# Patient Record
Sex: FEMALE | Race: WHITE | NOT HISPANIC OR LATINO | Employment: OTHER | URBAN - METROPOLITAN AREA
[De-identification: names, ages, dates, MRNs, and addresses within clinical notes are randomized per-mention and may not be internally consistent; named-entity substitution may affect disease eponyms.]

---

## 2017-01-02 RX ORDER — TRAZODONE HYDROCHLORIDE 100 MG/1
TABLET ORAL
Qty: 90 TABLET | Refills: 4 | Status: SHIPPED | OUTPATIENT
Start: 2017-01-02 | End: 2017-04-04 | Stop reason: SDUPTHER

## 2017-01-17 ENCOUNTER — TELEPHONE (OUTPATIENT)
Dept: FAMILY MEDICINE | Facility: CLINIC | Age: 72
End: 2017-01-17

## 2017-01-17 NOTE — TELEPHONE ENCOUNTER
----- Message from Malini aPtel sent at 1/17/2017  9:26 AM CST -----  Contact: Pt  Pt request call from nurse regarding surgery clearance with results from her last EKG that was suppose to be sent to Dr Waller but they have not received it yet and the phone number is [phone]  and ask for Vera, please contact pt at [phone]

## 2017-01-23 ENCOUNTER — TELEPHONE (OUTPATIENT)
Dept: FAMILY MEDICINE | Facility: CLINIC | Age: 72
End: 2017-01-23

## 2017-01-23 NOTE — TELEPHONE ENCOUNTER
----- Message from Tico Powelles sent at 1/23/2017  1:50 PM CST -----  Contact: Daniella-[facility] Rhkqwdjiny-[phone] Ext [phone]   Please refax pre-op clearance on patient, please refax clearance to [phone].  Pt surgery is scheduled for 01/24/16, surgery will be canceled if pre-op has not been received.  Please call back  @ [phone] Ext.[phone].  Thank-[facility]

## 2017-01-23 NOTE — TELEPHONE ENCOUNTER
----- Message from Mireya Bynum sent at 1/23/2017  2:00 PM CST -----  Contact: pt  Please call pt @ [phone], regarding clearance for surgery, pt states her surgery is tomorrow, she have called several times for this, please fax clearance to [phone] attn to Daniella.

## 2017-03-31 RX ORDER — LOSARTAN POTASSIUM AND HYDROCHLOROTHIAZIDE 12.5; 1 MG/1; MG/1
1 TABLET ORAL DAILY
Qty: 30 TABLET | Refills: 12 | Status: SHIPPED | OUTPATIENT
Start: 2017-03-31 | End: 2017-04-04 | Stop reason: SDUPTHER

## 2017-03-31 NOTE — TELEPHONE ENCOUNTER
----- Message from Charu Yuan sent at 3/31/2017 11:27 AM CDT -----  Contact: Patient   Patient does not have enough pills to last her until her mail order comes in for Rx Losartan, Please send in as an early refill to local pharmacy, Please call her at [phone].      [facility] [id number] [place] [address] [address] AT [facility] [address] [address]  Phone: [phone] Fax: [phone]    Thanks  Td

## 2017-04-04 RX ORDER — TRAZODONE HYDROCHLORIDE 100 MG/1
100 TABLET ORAL NIGHTLY
Qty: 90 TABLET | Refills: 3 | Status: SHIPPED | OUTPATIENT
Start: 2017-04-04 | End: 2018-04-19 | Stop reason: SDUPTHER

## 2017-04-04 RX ORDER — LOSARTAN POTASSIUM AND HYDROCHLOROTHIAZIDE 12.5; 1 MG/1; MG/1
1 TABLET ORAL DAILY
Qty: 90 TABLET | Refills: 3 | Status: SHIPPED | OUTPATIENT
Start: 2017-04-04 | End: 2018-04-19 | Stop reason: SDUPTHER

## 2017-04-04 RX ORDER — AMLODIPINE BESYLATE 10 MG/1
10 TABLET ORAL DAILY
Qty: 90 TABLET | Refills: 3 | Status: SHIPPED | OUTPATIENT
Start: 2017-04-04 | End: 2018-04-19 | Stop reason: SDUPTHER

## 2017-08-14 ENCOUNTER — TELEPHONE (OUTPATIENT)
Dept: FAMILY MEDICINE | Facility: CLINIC | Age: 72
End: 2017-08-14

## 2017-08-14 DIAGNOSIS — I10 ESSENTIAL HYPERTENSION: Primary | ICD-10-CM

## 2017-08-14 NOTE — TELEPHONE ENCOUNTER
----- Message from Tiffany Yuan sent at 8/14/2017 11:30 AM CDT -----  Contact: pt [phone]  Pt called requesting for mammogram orders to be sent to  [facility]   office [phone] and (FAX # [phone])    Also, Pt wanted to know if she can get orders for blood work placed at [facility]. So when she comes in to see you on 9/11, you can go over the mammogram and blood work results with her.    Pt is requesting a confirmation call so she knows when to schedule.    Pt can be reached at  [phone]    Thanks  carmen

## 2017-09-08 ENCOUNTER — LAB VISIT (OUTPATIENT)
Dept: LAB | Facility: HOSPITAL | Age: 72
End: 2017-09-08
Attending: FAMILY MEDICINE
Payer: MEDICARE

## 2017-09-08 DIAGNOSIS — I10 ESSENTIAL HYPERTENSION: ICD-10-CM

## 2017-09-08 LAB
ALBUMIN SERPL BCP-MCNC: 3.9 G/DL
ALP SERPL-CCNC: 78 U/L
ALT SERPL W/O P-5'-P-CCNC: 27 U/L
ANION GAP SERPL CALC-SCNC: 12 MMOL/L
AST SERPL-CCNC: 25 U/L
BASOPHILS # BLD AUTO: 0.03 K/UL
BASOPHILS NFR BLD: 0.6 %
BILIRUB SERPL-MCNC: 1.3 MG/DL
BUN SERPL-MCNC: 17 MG/DL
CALCIUM SERPL-MCNC: 9.7 MG/DL
CHLORIDE SERPL-SCNC: 102 MMOL/L
CHOLEST SERPL-MCNC: 195 MG/DL
CHOLEST/HDLC SERPL: 3.8 {RATIO}
CO2 SERPL-SCNC: 30 MMOL/L
CREAT SERPL-MCNC: 0.8 MG/DL
DIFFERENTIAL METHOD: ABNORMAL
EOSINOPHIL # BLD AUTO: 0.2 K/UL
EOSINOPHIL NFR BLD: 3.4 %
ERYTHROCYTE [DISTWIDTH] IN BLOOD BY AUTOMATED COUNT: 13.1 %
EST. GFR  (AFRICAN AMERICAN): >60 ML/MIN/1.73 M^2
EST. GFR  (NON AFRICAN AMERICAN): >60 ML/MIN/1.73 M^2
GLUCOSE SERPL-MCNC: 108 MG/DL
HCT VFR BLD AUTO: 41.1 %
HDLC SERPL-MCNC: 51 MG/DL
HDLC SERPL: 26.2 %
HGB BLD-MCNC: 13.9 G/DL
LDLC SERPL CALC-MCNC: 104.8 MG/DL
LYMPHOCYTES # BLD AUTO: 1.7 K/UL
LYMPHOCYTES NFR BLD: 31.5 %
MCH RBC QN AUTO: 31.3 PG
MCHC RBC AUTO-ENTMCNC: 33.8 G/DL
MCV RBC AUTO: 93 FL
MONOCYTES # BLD AUTO: 0.3 K/UL
MONOCYTES NFR BLD: 6.2 %
NEUTROPHILS # BLD AUTO: 3.1 K/UL
NEUTROPHILS NFR BLD: 58.1 %
NONHDLC SERPL-MCNC: 144 MG/DL
PLATELET # BLD AUTO: 207 K/UL
PMV BLD AUTO: 11.5 FL
POTASSIUM SERPL-SCNC: 3.6 MMOL/L
PROT SERPL-MCNC: 7.1 G/DL
RBC # BLD AUTO: 4.44 M/UL
SODIUM SERPL-SCNC: 144 MMOL/L
TRIGL SERPL-MCNC: 196 MG/DL
WBC # BLD AUTO: 5.33 K/UL

## 2017-09-08 PROCEDURE — 80053 COMPREHEN METABOLIC PANEL: CPT

## 2017-09-08 PROCEDURE — 85025 COMPLETE CBC W/AUTO DIFF WBC: CPT

## 2017-09-08 PROCEDURE — 36415 COLL VENOUS BLD VENIPUNCTURE: CPT | Mod: PO

## 2017-09-08 PROCEDURE — 80061 LIPID PANEL: CPT

## 2017-09-11 ENCOUNTER — OFFICE VISIT (OUTPATIENT)
Dept: FAMILY MEDICINE | Facility: CLINIC | Age: 72
End: 2017-09-11
Payer: MEDICARE

## 2017-09-11 ENCOUNTER — HOSPITAL ENCOUNTER (OUTPATIENT)
Dept: RADIOLOGY | Facility: HOSPITAL | Age: 72
Discharge: HOME | End: 2017-09-11
Attending: NURSE PRACTITIONER
Payer: MEDICARE

## 2017-09-11 VITALS
DIASTOLIC BLOOD PRESSURE: 59 MMHG | SYSTOLIC BLOOD PRESSURE: 113 MMHG | TEMPERATURE: 98 F | HEIGHT: 66 IN | WEIGHT: 235.56 LBS | BODY MASS INDEX: 37.86 KG/M2 | HEART RATE: 62 BPM

## 2017-09-11 VITALS — BODY MASS INDEX: 38.67 KG/M2 | HEIGHT: 66 IN | WEIGHT: 240.63 LBS

## 2017-09-11 DIAGNOSIS — D64.9 ANEMIA, UNSPECIFIED TYPE: ICD-10-CM

## 2017-09-11 DIAGNOSIS — I10 ESSENTIAL HYPERTENSION: Primary | ICD-10-CM

## 2017-09-11 DIAGNOSIS — E66.01 MORBID OBESITY, UNSPECIFIED OBESITY TYPE: ICD-10-CM

## 2017-09-11 DIAGNOSIS — Z13.820 SCREENING FOR OSTEOPOROSIS: ICD-10-CM

## 2017-09-11 DIAGNOSIS — M81.0 OSTEOPOROSIS, UNSPECIFIED OSTEOPOROSIS TYPE, UNSPECIFIED PATHOLOGICAL FRACTURE PRESENCE: ICD-10-CM

## 2017-09-11 DIAGNOSIS — Z00.00 ROUTINE CHECK-UP: Primary | ICD-10-CM

## 2017-09-11 DIAGNOSIS — E55.9 VITAMIN D DEFICIENCY: ICD-10-CM

## 2017-09-11 DIAGNOSIS — M89.9 DISORDER OF BONE: ICD-10-CM

## 2017-09-11 DIAGNOSIS — R41.3 MEMORY DEFICIT: ICD-10-CM

## 2017-09-11 DIAGNOSIS — F51.01 PRIMARY INSOMNIA: ICD-10-CM

## 2017-09-11 PROCEDURE — 99397 PER PM REEVAL EST PAT 65+ YR: CPT | Mod: S$GLB,,, | Performed by: FAMILY MEDICINE

## 2017-09-11 PROCEDURE — 99499 UNLISTED E&M SERVICE: CPT | Mod: S$GLB,,, | Performed by: NURSE PRACTITIONER

## 2017-09-11 PROCEDURE — 77080 DXA BONE DENSITY AXIAL: CPT | Mod: 26,,, | Performed by: RADIOLOGY

## 2017-09-11 PROCEDURE — 99999 PR PBB SHADOW E&M-EST. PATIENT-LVL III: CPT | Mod: PBBFAC,,, | Performed by: NURSE PRACTITIONER

## 2017-09-11 PROCEDURE — 77080 DXA BONE DENSITY AXIAL: CPT | Mod: TC,PO

## 2017-09-11 PROCEDURE — 96160 PT-FOCUSED HLTH RISK ASSMT: CPT | Mod: S$GLB,,, | Performed by: NURSE PRACTITIONER

## 2017-09-11 PROCEDURE — 99999 PR PBB SHADOW E&M-EST. PATIENT-LVL II: CPT | Mod: PBBFAC,,, | Performed by: FAMILY MEDICINE

## 2017-09-11 RX ORDER — ERGOCALCIFEROL 1.25 MG/1
50000 CAPSULE ORAL
COMMUNITY
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
## 2017-09-11 NOTE — PROGRESS NOTES
"Betzaida Lerma presented for a  Medicare AWV and comprehensive Health Risk Assessment today. The following components were reviewed and updated:    · Medical history  · Family History  · Social history  · Allergies and Current Medications  · Health Risk Assessment  · Health Maintenance  · Care Team     ** See Completed Assessments for Annual Wellness Visit within the encounter summary.**       The following assessments were completed:  · Living Situation  · CAGE  · Depression Screening  · Timed Get Up and Go  · Whisper Test  · Cognitive Function Screening  · Nutrition Screening  · ADL Screening  · PAQ Screening    Vitals:    09/11/17 1312   BP: (!) 113/59   BP Location: Left arm   Patient Position: Sitting   BP Method: Large (Automatic)   Pulse: 62   Temp: 97.5 °F (36.4 °C)   TempSrc: Oral   Weight: 106.8 kg (235 lb 9 oz)   Height: 5' 6" (1.676 m)     Body mass index is 38.02 kg/m².  Physical Exam   Constitutional: She is oriented to person, place, and time. She appears well-developed and well-nourished. No distress.   HENT:   Head: Normocephalic and atraumatic.   Eyes: EOM are normal. Pupils are equal, round, and reactive to light.   Neck: Normal range of motion. Neck supple.   Cardiovascular: Normal rate and regular rhythm.    Pulmonary/Chest: Effort normal and breath sounds normal.   Musculoskeletal: Normal range of motion.   Neurological: She is alert and oriented to person, place, and time.   Skin: Skin is warm and dry. No rash noted.   Psychiatric: She has a normal mood and affect. Judgment normal.   Nursing note and vitals reviewed.        Diagnoses and health risks identified today and associated recommendations/orders:    1. Essential hypertension  Stable and controlled on Norvasc and Losartan HCTZ  Continue medication as prescribed  Continue current treatment plan as previously prescribed with your PCP      2. Anemia, unspecified type  Stable- continue to monitor CBC    3. Primary insomnia  Stable and " controlled on Trazodone  Continue medication as prescribed  Continue current treatment plan as previously prescribed with your PCP      4. Screening for osteoporosis    - DXA Bone Density Spine And Hip; Future    5. Disorder of bone    - DXA Bone Density Spine And Hip; Future    6. Severe obesity  Encourage healthy diet with increased activity and weight loss    Pt states last colonoscopy was with Dr Munoz on the [facility], she was told to f/u in 2018. She refuses all immunizations at this time    Provided Betzaida Loyola with a 5-10 year written screening schedule and personal prevention plan. Recommendations were developed using the USPSTF age appropriate recommendations. Education, counseling, and referrals were provided as needed. After Visit Summary printed and given to patient which includes a list of additional screenings\tests needed.    Return if symptoms worsen or fail to improve, for Routine scheduled appointment.    Rosa Lezama NP

## 2017-09-22 ENCOUNTER — TELEPHONE (OUTPATIENT)
Dept: FAMILY MEDICINE | Facility: CLINIC | Age: 72
End: 2017-09-22

## 2017-09-22 DIAGNOSIS — E55.9 VITAMIN D DEFICIENCY: ICD-10-CM

## 2017-09-22 DIAGNOSIS — M85.80 OSTEOPENIA, UNSPECIFIED LOCATION: Primary | ICD-10-CM

## 2017-09-28 PROBLEM — E66.01 SEVERE OBESITY: Status: ACTIVE | Noted: 2017-09-28

## 2017-09-28 NOTE — PROGRESS NOTES
Patient, Betzaida Lerma (MRN #[id number]), presented with a recorded BMI of 38.02 kg/m^2 and a documented comorbidity(s):  - Hypertension  to which the severe obesity is a contributing factor. This is consistent with the definition of severe obesity (BMI 35.0-35.9) with comorbidity (ICD-10 E66.01, Z68.35). The patient's severe obesity was monitored, evaluated, addressed and/or treated. This addendum to the medical record is made on 09/28/2017.

## 2017-12-12 ENCOUNTER — OFFICE VISIT (OUTPATIENT)
Dept: FAMILY MEDICINE | Facility: CLINIC | Age: 72
End: 2017-12-12
Payer: MEDICARE

## 2017-12-12 VITALS
HEIGHT: 66 IN | BODY MASS INDEX: 36.74 KG/M2 | TEMPERATURE: 98 F | WEIGHT: 228.63 LBS | SYSTOLIC BLOOD PRESSURE: 134 MMHG | DIASTOLIC BLOOD PRESSURE: 68 MMHG | HEART RATE: 62 BPM

## 2017-12-12 DIAGNOSIS — N39.3 STRESS INCONTINENCE OF URINE: ICD-10-CM

## 2017-12-12 DIAGNOSIS — S29.012A STRAIN OF LATISSIMUS DORSI MUSCLE, INITIAL ENCOUNTER: ICD-10-CM

## 2017-12-12 DIAGNOSIS — M25.552 ARTHRALGIA OF LEFT HIP: Primary | ICD-10-CM

## 2017-12-12 PROCEDURE — 99214 OFFICE O/P EST MOD 30 MIN: CPT | Mod: S$GLB,,, | Performed by: FAMILY MEDICINE

## 2017-12-12 PROCEDURE — 99999 PR PBB SHADOW E&M-EST. PATIENT-LVL III: CPT | Mod: PBBFAC,,, | Performed by: FAMILY MEDICINE

## 2017-12-12 RX ORDER — MELOXICAM 15 MG/1
15 TABLET ORAL DAILY
Qty: 30 TABLET | Refills: 6 | Status: SHIPPED | OUTPATIENT
Start: 2017-12-12 | End: 2018-06-27

## 2017-12-12 RX ORDER — OXYBUTYNIN CHLORIDE 5 MG/1
5 TABLET, EXTENDED RELEASE ORAL DAILY
Qty: 30 TABLET | Refills: 11 | Status: SHIPPED | OUTPATIENT
Start: 2017-12-12 | End: 2018-10-17
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
## 2018-02-26 ENCOUNTER — TELEPHONE (OUTPATIENT)
Dept: FAMILY MEDICINE | Facility: CLINIC | Age: 73
End: 2018-02-26
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
## 2018-02-26 NOTE — TELEPHONE ENCOUNTER
----- Message from Yandy Santana sent at 2/26/2018  8:38 AM CST -----  Pt at [phone]//states she takes 2 blood pressure meds per day//she is now taking them in the morning instead of at night//since she changed the time of day she takes them she wakes up in the morning with lightheadedness/dizzy//would like to discuss with your office//please call//thanks/safia

## 2018-03-26 ENCOUNTER — LAB VISIT (OUTPATIENT)
Dept: LAB | Facility: HOSPITAL | Age: 73
End: 2018-03-26
Attending: FAMILY MEDICINE
Payer: MEDICARE

## 2018-03-26 DIAGNOSIS — E55.9 VITAMIN D DEFICIENCY: ICD-10-CM

## 2018-03-26 LAB — 25(OH)D3+25(OH)D2 SERPL-MCNC: 25 NG/ML

## 2018-03-26 PROCEDURE — 82306 VITAMIN D 25 HYDROXY: CPT

## 2018-03-26 PROCEDURE — 36415 COLL VENOUS BLD VENIPUNCTURE: CPT | Mod: PO

## 2018-03-27 ENCOUNTER — TELEPHONE (OUTPATIENT)
Dept: FAMILY MEDICINE | Facility: CLINIC | Age: 73
End: 2018-03-27

## 2018-03-27 DIAGNOSIS — E55.9 VITAMIN D DEFICIENCY: Primary | ICD-10-CM

## 2018-04-19 RX ORDER — LOSARTAN POTASSIUM AND HYDROCHLOROTHIAZIDE 12.5; 1 MG/1; MG/1
1 TABLET ORAL DAILY
Qty: 90 TABLET | Refills: 3 | Status: CANCELLED | OUTPATIENT
Start: 2018-04-19

## 2018-04-19 RX ORDER — LOSARTAN POTASSIUM AND HYDROCHLOROTHIAZIDE 12.5; 1 MG/1; MG/1
1 TABLET ORAL DAILY
Qty: 90 TABLET | Refills: 3 | Status: SHIPPED | OUTPATIENT
Start: 2018-04-19 | End: 2018-04-19 | Stop reason: SDUPTHER

## 2018-04-19 RX ORDER — AMLODIPINE BESYLATE 10 MG/1
10 TABLET ORAL DAILY
Qty: 90 TABLET | Refills: 3 | Status: CANCELLED | OUTPATIENT
Start: 2018-04-19

## 2018-04-19 RX ORDER — TRAZODONE HYDROCHLORIDE 100 MG/1
100 TABLET ORAL NIGHTLY
Qty: 90 TABLET | Refills: 3 | Status: SHIPPED | OUTPATIENT
Start: 2018-04-19 | End: 2018-04-19 | Stop reason: SDUPTHER

## 2018-04-19 RX ORDER — TRAZODONE HYDROCHLORIDE 100 MG/1
100 TABLET ORAL NIGHTLY
Qty: 90 TABLET | Refills: 3 | Status: CANCELLED | OUTPATIENT
Start: 2018-04-19

## 2018-04-19 RX ORDER — AMLODIPINE BESYLATE 10 MG/1
10 TABLET ORAL DAILY
Qty: 90 TABLET | Refills: 3 | Status: SHIPPED | OUTPATIENT
Start: 2018-04-19 | End: 2018-04-19 | Stop reason: SDUPTHER

## 2018-04-19 NOTE — TELEPHONE ENCOUNTER
----- Message from Eneida Cadena sent at 4/19/2018 10:15 AM CDT -----  Contact: pt  Pt stated she calling for a refill on her medication, she can be reached at [phone] Thanks    1. What is the name of the medication you are requesting? Losartan hctz   2. What is the dose? 112.5 mg tab  3. How do you take the medication? Orally, topically, etc? orally  4. How often do you take this medication? 1 a day   5. Do you need a 30 day or 90 day supply? 90  6. How many refills are you requesting?   7. What is your preferred pharmacy and location of the pharmacy?   8. Who can we contact with further questions?     1. What is the name of the medication you are requesting? amlogipine  2. What is the dose? 10 mg   3. How do you take the medication? Orally, topically, etc? orally  4. How often do you take this medication? 1 a day  5. Do you need a 30 day or 90 day supply? 90  6. How many refills are you requesting?   7. What is your preferred pharmacy and location of the pharmacy?  8. Who can we contact with further questions?     1. What is the name of the medication you are requesting? Trazodone   2. What is the dose? 100 mg  3. How do you take the medication? Orally, topically, etc? orally  4. How often do you take this medication? 1 a day   5. Do you need a 30 day or 90 day supply? 90  6. How many refills are you requesting?   7. What is your preferred pharmacy and location of the pharmacy?   8. Who can we contact with further questions?         [facility] - [place], [place] - [address]  [address] [facility] [address] [facility] [address]  Phone: [phone] Fax: [phone]

## 2018-04-19 NOTE — TELEPHONE ENCOUNTER
----- Message from Mireya Bynum sent at 4/19/2018  2:00 PM CDT -----  Contact: pt  1. What is the name of the medication you are requesting? amlodipine, Trazadone, losartan  2. What is the dose? na  3. How do you take the medication? Orally, topically, etc? orally  4. How often do you take this medication? 1daily  5. Do you need a 30 day or 90 day supply? 90  6. How many refills are you requesting? 1  7. What is your preferred pharmacy and location of the pharmacy? [facility]  8. Who can we contact with further questions? [phone]

## 2018-04-20 RX ORDER — TRAZODONE HYDROCHLORIDE 100 MG/1
100 TABLET ORAL NIGHTLY
Qty: 90 TABLET | Refills: 3 | Status: SHIPPED | OUTPATIENT
Start: 2018-04-20

## 2018-04-20 RX ORDER — AMLODIPINE BESYLATE 10 MG/1
10 TABLET ORAL DAILY
Qty: 90 TABLET | Refills: 3 | Status: SHIPPED | OUTPATIENT
Start: 2018-04-20

## 2018-04-20 RX ORDER — LOSARTAN POTASSIUM AND HYDROCHLOROTHIAZIDE 12.5; 1 MG/1; MG/1
1 TABLET ORAL DAILY
Qty: 90 TABLET | Refills: 3 | Status: SHIPPED | OUTPATIENT
Start: 2018-04-20

## 2018-04-24 ENCOUNTER — TELEPHONE (OUTPATIENT)
Dept: FAMILY MEDICINE | Facility: CLINIC | Age: 73
End: 2018-04-24

## 2018-04-24 NOTE — TELEPHONE ENCOUNTER
----- Message from Clara Yuan sent at 4/24/2018  2:09 PM CDT -----  Contact: pt   Call pt regarding questions about her med.    [phone]

## 2018-04-30 ENCOUNTER — PES CALL (OUTPATIENT)
Dept: ADMINISTRATIVE | Facility: CLINIC | Age: 73
End: 2018-04-30

## 2018-06-01 ENCOUNTER — PES CALL (OUTPATIENT)
Dept: ADMINISTRATIVE | Facility: CLINIC | Age: 73
End: 2018-06-01

## 2018-06-27 ENCOUNTER — OFFICE VISIT (OUTPATIENT)
Dept: FAMILY MEDICINE | Facility: CLINIC | Age: 73
End: 2018-06-27
Payer: MEDICARE

## 2018-06-27 ENCOUNTER — HOSPITAL ENCOUNTER (OUTPATIENT)
Dept: RADIOLOGY | Facility: HOSPITAL | Age: 73
Discharge: HOME | End: 2018-06-27
Attending: NURSE PRACTITIONER
Payer: MEDICARE

## 2018-06-27 VITALS
TEMPERATURE: 98 F | HEART RATE: 66 BPM | SYSTOLIC BLOOD PRESSURE: 140 MMHG | HEIGHT: 65 IN | WEIGHT: 217 LBS | DIASTOLIC BLOOD PRESSURE: 79 MMHG | BODY MASS INDEX: 36.15 KG/M2
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
## 2018-06-27 DIAGNOSIS — M53.3 SACRAL BACK PAIN: ICD-10-CM
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
## 2018-06-27 PROCEDURE — 99213 OFFICE O/P EST LOW 20 MIN: CPT | Mod: S$GLB,,, | Performed by: NURSE PRACTITIONER

## 2018-06-27 PROCEDURE — 72220 X-RAY EXAM SACRUM TAILBONE: CPT | Mod: TC,PO

## 2018-06-27 PROCEDURE — 3078F DIAST BP <80 MM HG: CPT | Mod: CPTII,S$GLB,, | Performed by: NURSE PRACTITIONER

## 2018-06-27 PROCEDURE — 3077F SYST BP >= 140 MM HG: CPT | Mod: CPTII,S$GLB,, | Performed by: NURSE PRACTITIONER

## 2018-06-27 PROCEDURE — 99999 PR PBB SHADOW E&M-EST. PATIENT-LVL IV: CPT | Mod: PBBFAC,,, | Performed by: NURSE PRACTITIONER

## 2018-06-27 PROCEDURE — 72220 X-RAY EXAM SACRUM TAILBONE: CPT | Mod: 26,,, | Performed by: RADIOLOGY
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
## 2018-06-28 ENCOUNTER — TELEPHONE (OUTPATIENT)
Dept: FAMILY MEDICINE | Facility: CLINIC | Age: 73
End: 2018-06-28

## 2018-06-28 NOTE — TELEPHONE ENCOUNTER
----- Message from Yandy Santana sent at 6/28/2018 12:12 PM CDT -----  Pt at [phone]//states she has an x-ray done on her lower back yesterday//is calling for the results//please call//thanks/[facility]

## 2018-07-06 RX ORDER — IBUPROFEN 800 MG/1
TABLET ORAL
Qty: 270 TABLET | Refills: 3 | Status: ON HOLD | OUTPATIENT
Start: 2018-07-06 | End: 2018-10-31 | Stop reason: HOSPADM

## 2018-07-06 NOTE — TELEPHONE ENCOUNTER
----- Message from Mireya Bynum sent at 7/6/2018 12:12 PM CDT -----  Contact: pt  1. What is the name of the medication you are requesting? Ibuprofen   2. What is the dose? 800mg  3. How do you take the medication? Orally, topically, etc? orally  4. How often do you take this medication? 1xday  5. Do you need a 30 day or 90 day supply? 90  6. How many refills are you requesting? 1  7. What is your preferred pharmacy and location of the pharmacy? [facility]  8. Who can we contact with further questions? Pt @ [phone]

## 2018-07-08 RX ORDER — MELOXICAM 15 MG/1
TABLET ORAL
Qty: 30 TABLET | Refills: 12 | Status: SHIPPED | OUTPATIENT
Start: 2018-07-08 | End: 2018-10-17 | Stop reason: CLARIF

## 2018-07-09 ENCOUNTER — TELEPHONE (OUTPATIENT)
Dept: FAMILY MEDICINE | Facility: CLINIC | Age: 73
End: 2018-07-09

## 2018-07-09 NOTE — TELEPHONE ENCOUNTER
----- Message from Rani Chapman sent at 7/9/2018  9:46 AM CDT -----   Patient would like a refill for iv profen. Please contact

## 2018-07-27 ENCOUNTER — LAB VISIT (OUTPATIENT)
Dept: LAB | Facility: HOSPITAL | Age: 73
End: 2018-07-27
Attending: FAMILY MEDICINE
Payer: MEDICARE

## 2018-07-27 DIAGNOSIS — E55.9 VITAMIN D DEFICIENCY: ICD-10-CM

## 2018-07-27 LAB — 25(OH)D3+25(OH)D2 SERPL-MCNC: 30 NG/ML

## 2018-07-27 PROCEDURE — 82306 VITAMIN D 25 HYDROXY: CPT

## 2018-07-27 PROCEDURE — 36415 COLL VENOUS BLD VENIPUNCTURE: CPT | Mod: PO

## 2018-10-03 ENCOUNTER — LAB VISIT (OUTPATIENT)
Dept: LAB | Facility: HOSPITAL | Age: 73
End: 2018-10-03
Payer: MEDICARE

## 2018-10-03 DIAGNOSIS — Z01.818 PREOP EXAMINATION: Primary | ICD-10-CM

## 2018-10-03 LAB
ANION GAP SERPL CALC-SCNC: 10 MMOL/L
BACTERIA #/AREA URNS HPF: ABNORMAL /HPF
BASOPHILS # BLD AUTO: 0.03 K/UL
BASOPHILS NFR BLD: 0.5 %
BILIRUB UR QL STRIP: NEGATIVE
BUN SERPL-MCNC: 15 MG/DL
CALCIUM SERPL-MCNC: 9.9 MG/DL
CHLORIDE SERPL-SCNC: 102 MMOL/L
CLARITY UR: CLEAR
CO2 SERPL-SCNC: 30 MMOL/L
COLOR UR: YELLOW
CREAT SERPL-MCNC: 0.8 MG/DL
DIFFERENTIAL METHOD: ABNORMAL
EOSINOPHIL # BLD AUTO: 0.1 K/UL
EOSINOPHIL NFR BLD: 1.7 %
ERYTHROCYTE [DISTWIDTH] IN BLOOD BY AUTOMATED COUNT: 13.1 %
EST. GFR  (AFRICAN AMERICAN): >60 ML/MIN/1.73 M^2
EST. GFR  (NON AFRICAN AMERICAN): >60 ML/MIN/1.73 M^2
GLUCOSE SERPL-MCNC: 119 MG/DL
GLUCOSE UR QL STRIP: NEGATIVE
HCT VFR BLD AUTO: 42.6 %
HGB BLD-MCNC: 13.7 G/DL
HGB UR QL STRIP: NEGATIVE
HYALINE CASTS #/AREA URNS LPF: 0 /LPF
IMM GRANULOCYTES # BLD AUTO: 0.02 K/UL
IMM GRANULOCYTES NFR BLD AUTO: 0.3 %
KETONES UR QL STRIP: NEGATIVE
LEUKOCYTE ESTERASE UR QL STRIP: NEGATIVE
LYMPHOCYTES # BLD AUTO: 1.4 K/UL
LYMPHOCYTES NFR BLD: 21.5 %
MCH RBC QN AUTO: 31.4 PG
MCHC RBC AUTO-ENTMCNC: 32.2 G/DL
MCV RBC AUTO: 98 FL
MICROSCOPIC COMMENT: ABNORMAL
MONOCYTES # BLD AUTO: 0.6 K/UL
MONOCYTES NFR BLD: 8.7 %
NEUTROPHILS # BLD AUTO: 4.3 K/UL
NEUTROPHILS NFR BLD: 67.3 %
NITRITE UR QL STRIP: NEGATIVE
NRBC BLD-RTO: 0 /100 WBC
PH UR STRIP: 6 [PH] (ref 5–8)
PLATELET # BLD AUTO: 191 K/UL
PMV BLD AUTO: 11.7 FL
POTASSIUM SERPL-SCNC: 3.3 MMOL/L
PROT UR QL STRIP: ABNORMAL
RBC # BLD AUTO: 4.36 M/UL
RBC #/AREA URNS HPF: 1 /HPF (ref 0–4)
SODIUM SERPL-SCNC: 142 MMOL/L
SP GR UR STRIP: 1.03 (ref 1–1.03)
SQUAMOUS #/AREA URNS HPF: 20 /HPF
URN SPEC COLLECT METH UR: ABNORMAL
WBC # BLD AUTO: 6.43 K/UL
WBC #/AREA URNS HPF: 2 /HPF (ref 0–5)

## 2018-10-03 PROCEDURE — 85025 COMPLETE CBC W/AUTO DIFF WBC: CPT

## 2018-10-03 PROCEDURE — 81000 URINALYSIS NONAUTO W/SCOPE: CPT | Mod: PO

## 2018-10-03 PROCEDURE — 36415 COLL VENOUS BLD VENIPUNCTURE: CPT | Mod: PO

## 2018-10-03 PROCEDURE — 80048 BASIC METABOLIC PNL TOTAL CA: CPT

## 2018-10-17 ENCOUNTER — ANESTHESIA EVENT (OUTPATIENT)
Dept: SURGERY | Facility: HOSPITAL | Age: 73
DRG: 470 | End: 2018-10-17
Payer: MEDICARE

## 2018-10-17 ENCOUNTER — HOSPITAL ENCOUNTER (OUTPATIENT)
Dept: PREADMISSION TESTING | Facility: HOSPITAL | Age: 73
Discharge: HOME | End: 2018-10-17
Payer: MEDICARE

## 2018-10-17 ENCOUNTER — OFFICE VISIT (OUTPATIENT)
Dept: FAMILY MEDICINE | Facility: HOSPITAL | Age: 73
End: 2018-10-17
Attending: FAMILY MEDICINE
Payer: MEDICARE

## 2018-10-17 VITALS
OXYGEN SATURATION: 99 % | WEIGHT: 217 LBS | HEIGHT: 66 IN | HEART RATE: 60 BPM | SYSTOLIC BLOOD PRESSURE: 156 MMHG | BODY MASS INDEX: 34.87 KG/M2 | RESPIRATION RATE: 18 BRPM | DIASTOLIC BLOOD PRESSURE: 78 MMHG

## 2018-10-17 VITALS
BODY MASS INDEX: 34.9 KG/M2 | SYSTOLIC BLOOD PRESSURE: 123 MMHG | HEART RATE: 62 BPM | HEIGHT: 66 IN | DIASTOLIC BLOOD PRESSURE: 68 MMHG | WEIGHT: 217.13 LBS

## 2018-10-17 DIAGNOSIS — Z01.818 PREOP TESTING: Primary | ICD-10-CM

## 2018-10-17 DIAGNOSIS — I10 ESSENTIAL HYPERTENSION: ICD-10-CM

## 2018-10-17 DIAGNOSIS — E66.01 SEVERE OBESITY: ICD-10-CM

## 2018-10-17 DIAGNOSIS — F51.01 PRIMARY INSOMNIA: ICD-10-CM

## 2018-10-17 PROCEDURE — 99214 OFFICE O/P EST MOD 30 MIN: CPT | Performed by: STUDENT IN AN ORGANIZED HEALTH CARE EDUCATION/TRAINING PROGRAM

## 2018-10-17 RX ORDER — POTASSIUM CHLORIDE 1500 MG/1
TABLET, EXTENDED RELEASE ORAL
Refills: 0 | COMMUNITY
Start: 2018-10-11

## 2018-10-17 RX ORDER — SODIUM CHLORIDE, SODIUM LACTATE, POTASSIUM CHLORIDE, CALCIUM CHLORIDE 600; 310; 30; 20 MG/100ML; MG/100ML; MG/100ML; MG/100ML
INJECTION, SOLUTION INTRAVENOUS CONTINUOUS
Status: CANCELLED | OUTPATIENT
Start: 2018-10-17

## 2018-10-17 RX ORDER — LIDOCAINE HYDROCHLORIDE 10 MG/ML
1 INJECTION, SOLUTION EPIDURAL; INFILTRATION; INTRACAUDAL; PERINEURAL ONCE
Status: CANCELLED | OUTPATIENT
Start: 2018-10-17 | End: 2018-10-17

## 2018-10-17 RX ORDER — METHOCARBAMOL 500 MG/1
TABLET, FILM COATED ORAL
Refills: 1 | COMMUNITY
Start: 2018-09-21

## 2018-10-17 RX ORDER — CEFAZOLIN SODIUM 2 G/50ML
2 SOLUTION INTRAVENOUS
Status: CANCELLED | OUTPATIENT
Start: 2018-10-30
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
## 2018-10-17 NOTE — DISCHARGE INSTRUCTIONS
Your surgery is scheduled for 10/30/18.    Please report to Outpatient Surgery Intake Office on the 2nd FLOOR at 5:30 a.m.          INSTRUCTIONS IMPORTANT!!!  ¨ Do not eat or drink after 12 midnight-including water. OK to brush teeth, no   gum, candy or mints!    ¨ Take only these medicines with a small swallow of water-morning of surgery: amlodipine and losartan      ____  Do not wear makeup, including mascara.  ____  No powder, lotions or creams to surgical area.  ____  Please remove all jewelry, including piercings and leave at home.  ____  No money or valuables needed. Please leave at home.  ____  Please bring any documents given by your doctor.  ____  If going home the same day, arrange for a ride home. You will not be able to             drive if Anesthesia was used.  ____  Wear loose fitting clothing. Allow for dressings, bandages.  ____  Stop Aspirin, Ibuprofen, Motrin and Aleve at least 3-5 days before surgery, unless otherwise instructed by your doctor, or the nurse.   You MAY use Tylenol/acetaminophen until day of surgery.  ____  Wash the surgical area with Hibiclens the night before surgery, and again the             morning of surgery.  Be sure to rinse hibiclens off completely (if instructed by   nurse).  ____  If you take diabetic medication, do not take am of surgery unless instructed by Doctor.  ____  Call MD for temperature above 101 degrees or any other signs of infection such as Urinary (bladder) infection, Upper respiratory infection, skin boils, etc.  ____ Stop taking any Fish Oil supplement or any Vitamins that contain Vitamin E at least 5 days prior to surgery.  ____ Do Not wear your contact lenses the day of your procedure.  You may wear your glasses.      ____Do not shave for 3 days prior to surgery.  ____ Practice Good hand washing before, during, and after procedure.      I have read or had read and explained to me, and understand the above information.  Additional comments or  instructions:  For additional questions call [phone]      Pre-Op Bathing Instructions    Before surgery, you can play an important role in your own health.    Because skin is not sterile, we need to be sure that your skin is as free of germs as possible. By following the instructions below, you can reduce the number of germs on your skin before surgery.    IMPORTANT: You will need to shower with a special soap called Hibiclens*, available at any pharmacy.  If you are allergic to Chlorhexidine (the antiseptic in Hibiclens), use an antibacterial soap such as Dial Soap for your preoperative shower.  You will shower with Hibiclens both the night before your surgery and the morning of your surgery.  Do not use Hibiclens on the head, face or genitals to avoid injury to those areas.    STEP #1: THE NIGHT BEFORE YOUR SURGERY     1. Do not shave the area of your body where your surgery will be performed.  2. Shower and wash your hair and body as usual with your normal soap and shampoo.  3. Rinse your hair and body thoroughly after you shower to remove all soap residue.  4. With your hand, apply one packet of Hibiclens soap to the surgical site.   5. Wash the site gently for five (5) minutes. Do not scrub your skin too hard.   6. Do not wash with your regular soap after Hibiclens is used.  7. Rinse your body thoroughly.  8. Pat yourself dry with a clean, soft towel.  9. Do not use lotion, cream, or powder.  10. Wear clean clothes.    STEP #2: THE MORNING OF YOUR SURGERY     1. Repeat Step #1.    * Not to be used by people allergic to Chlorhexidine.      Total Knee Replacement  During total knee replacement surgery, your damaged knee joint is replaced with an artificial joint, called a prosthesis. This surgery almost always reduces joint pain and improves your quality of life.     The parts of the prosthesis are secured to the bones of the knee. Together they form the new joint.   Before your surgery  You will most likely  arrive at the hospital on the morning of the surgery. Be sure to follow all of your doctors instructions on preparing for surgery:  · Follow any directions you are given for taking medicines or for not eating or drinking before surgery.  · At the hospital, your temperature, pulse, breathing, and blood pressure will be checked.  · An IV (intravenous) line will be started to provide fluids and medicines needed during surgery.  The surgical procedure  When the surgical team is ready, youll be taken to the operating room. There youll be given anesthesia to help you sleep through surgery, or to make you numb from the waist down. Then an incision is made on the front or side of your knee. Any damaged bone is cleaned away, and the new joint components are put into place. The incision is closed with surgical staples or stitches.  After your surgery  After surgery, youll be sent to the recovery room. When you are fully awake, youll be moved to your room. The nurses will give you medicines to ease your pain. You may have a catheter (small tube) in your bladder. A continuous passive motion machine may be used on your knee to keep it from getting stiff. A sequential compression machine may be used to prevent blood clots by gently squeezing then releasing your leg. You may be given medicine to prevent blood clots. Soon, healthcare providers will help you get up and moving.  When to call your doctor  Once at home, call your doctor if you have any of the symptoms below:  · An increase in knee pain  · Pain or swelling in the calf or leg  · Unusual redness, heat, or drainage at the incision site  · Fever of 100.4°F (38°C) or higher  · Shaking chills  · Trouble breathing or chest pain (call 911)   Date Last Reviewed: 9/20/2015  © 6783-9788 [employer]. [address] [place] [address], [place] [address]. All rights reserved. This information is not intended as a substitute for professional medical care. Always follow your  healthcare professional's instructions.
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
## 2018-10-17 NOTE — PRE-PROCEDURE INSTRUCTIONS
Srinath - [phone] -     Allergies, medical, surgical, family and psychosocial histories reviewed with patient. Periop plan of care reviewed. Preop instructions given, including medications to take and to hold. Hibiclens soap and instructions on use given. Time allotted for questions to be addressed.  Patient verbalized understanding.

## 2018-10-17 NOTE — PRE-PROCEDURE INSTRUCTIONS
Spoke with Dr. Colvin's MA to have all paperwork and clearance faxed to [phone].  Will fax asap

## 2018-10-18 NOTE — PROGRESS NOTES
I assume primary medical responsibility for this patient, I have seen the patient, reviewed the case history, findings, diagnosis and treatment plan with the resident and agree that the care is reasonable and necessary.  Racheal Ariza  10/18/2018

## 2018-10-24 ENCOUNTER — PES CALL (OUTPATIENT)
Dept: ADMINISTRATIVE | Facility: CLINIC | Age: 73
End: 2018-10-24

## 2018-10-24 NOTE — PLAN OF CARE
Spoke to Vilma in Dr. Rider office ([phone]) for most recent EKG report and note of optimization. She will send message to MA for follow up.

## 2018-10-25 ENCOUNTER — LAB VISIT (OUTPATIENT)
Dept: LAB | Facility: HOSPITAL | Age: 73
End: 2018-10-25
Attending: STUDENT IN AN ORGANIZED HEALTH CARE EDUCATION/TRAINING PROGRAM
Payer: MEDICARE

## 2018-10-25 DIAGNOSIS — Z01.818 PREOP TESTING: ICD-10-CM

## 2018-10-25 LAB
ANION GAP SERPL CALC-SCNC: 9 MMOL/L
BUN SERPL-MCNC: 19 MG/DL
CALCIUM SERPL-MCNC: 9.5 MG/DL
CHLORIDE SERPL-SCNC: 105 MMOL/L
CO2 SERPL-SCNC: 28 MMOL/L
CREAT SERPL-MCNC: 0.8 MG/DL
EST. GFR  (AFRICAN AMERICAN): >60 ML/MIN/1.73 M^2
EST. GFR  (NON AFRICAN AMERICAN): >60 ML/MIN/1.73 M^2
GLUCOSE SERPL-MCNC: 111 MG/DL
POTASSIUM SERPL-SCNC: 4.3 MMOL/L
SODIUM SERPL-SCNC: 142 MMOL/L

## 2018-10-25 PROCEDURE — 80048 BASIC METABOLIC PNL TOTAL CA: CPT

## 2018-10-25 PROCEDURE — 36415 COLL VENOUS BLD VENIPUNCTURE: CPT | Mod: PO

## 2018-10-29 ENCOUNTER — TELEPHONE (OUTPATIENT)
Dept: FAMILY MEDICINE | Facility: HOSPITAL | Age: 73
End: 2018-10-29

## 2018-10-29 NOTE — TELEPHONE ENCOUNTER
I called . Betzaida Lerma and left her a voicemail instructing her to return my call. Her Potassium is within the appropriate range to proceed with operative intervention for the total knee arthroplasty. Encouraged her to call the clinic for any additional questions/concerns that she may have.

## 2018-10-30 ENCOUNTER — ANESTHESIA (OUTPATIENT)
Dept: SURGERY | Facility: HOSPITAL | Age: 73
DRG: 470 | End: 2018-10-30
Payer: MEDICARE

## 2018-10-30 ENCOUNTER — HOSPITAL ENCOUNTER (INPATIENT)
Facility: HOSPITAL | Age: 73
LOS: 2 days | Discharge: HOME HEALTH | DRG: 470 | End: 2018-11-02
Payer: MEDICARE

## 2018-10-30 DIAGNOSIS — M17.12 LEFT KNEE DJD: ICD-10-CM

## 2018-10-30 DIAGNOSIS — Z96.651 STATUS POST TOTAL RIGHT KNEE REPLACEMENT: Primary | ICD-10-CM

## 2018-10-30 DIAGNOSIS — M17.31 POST-TRAUMATIC OSTEOARTHRITIS OF RIGHT KNEE: ICD-10-CM

## 2018-10-30 DIAGNOSIS — R44.3 HALLUCINATIONS: ICD-10-CM

## 2018-10-30 DIAGNOSIS — Z01.818 PREOP TESTING: ICD-10-CM

## 2018-10-30 PROBLEM — T84.84XA PAINFUL ORTHOPAEDIC HARDWARE: Status: ACTIVE | Noted: 2018-10-30

## 2018-10-30 PROBLEM — M17.11 RIGHT KNEE DJD: Status: ACTIVE | Noted: 2018-10-30

## 2018-10-30 PROCEDURE — 63600175 PHARM REV CODE 636 W HCPCS

## 2018-10-30 PROCEDURE — 71000039 HC RECOVERY, EACH ADD'L HOUR

## 2018-10-30 PROCEDURE — 0QPG04Z REMOVAL OF INTERNAL FIXATION DEVICE FROM RIGHT TIBIA, OPEN APPROACH: ICD-10-PCS

## 2018-10-30 PROCEDURE — C1776 JOINT DEVICE (IMPLANTABLE): HCPCS

## 2018-10-30 PROCEDURE — 64448 NJX AA&/STRD FEM NRV NFS IMG: CPT | Performed by: ANESTHESIOLOGY

## 2018-10-30 PROCEDURE — 71000033 HC RECOVERY, INTIAL HOUR

## 2018-10-30 PROCEDURE — 63600175 PHARM REV CODE 636 W HCPCS: Performed by: ANESTHESIOLOGY

## 2018-10-30 PROCEDURE — 37000008 HC ANESTHESIA 1ST 15 MINUTES

## 2018-10-30 PROCEDURE — G8978 MOBILITY CURRENT STATUS: HCPCS | Mod: CK

## 2018-10-30 PROCEDURE — 25000003 PHARM REV CODE 250

## 2018-10-30 PROCEDURE — 76942 ECHO GUIDE FOR BIOPSY: CPT | Performed by: ANESTHESIOLOGY

## 2018-10-30 PROCEDURE — 88300 SURGICAL PATH GROSS: CPT | Performed by: PATHOLOGY

## 2018-10-30 PROCEDURE — 11000001 HC ACUTE MED/SURG PRIVATE ROOM

## 2018-10-30 PROCEDURE — 97165 OT EVAL LOW COMPLEX 30 MIN: CPT

## 2018-10-30 PROCEDURE — 36000711

## 2018-10-30 PROCEDURE — 36000710

## 2018-10-30 PROCEDURE — 3E0T3BZ INTRODUCTION OF ANESTHETIC AGENT INTO PERIPHERAL NERVES AND PLEXI, PERCUTANEOUS APPROACH: ICD-10-PCS | Performed by: ANESTHESIOLOGY

## 2018-10-30 PROCEDURE — 88300 SURGICAL PATH GROSS: CPT | Mod: 26,,, | Performed by: PATHOLOGY

## 2018-10-30 PROCEDURE — 0SRC0J9 REPLACEMENT OF RIGHT KNEE JOINT WITH SYNTHETIC SUBSTITUTE, CEMENTED, OPEN APPROACH: ICD-10-PCS

## 2018-10-30 PROCEDURE — 25000003 PHARM REV CODE 250: Performed by: ANESTHESIOLOGY

## 2018-10-30 PROCEDURE — 25000003 PHARM REV CODE 250: Performed by: STUDENT IN AN ORGANIZED HEALTH CARE EDUCATION/TRAINING PROGRAM

## 2018-10-30 PROCEDURE — 27200688 HC TRAY, SPINAL-HYPER/ ISOBARIC: Performed by: ANESTHESIOLOGY

## 2018-10-30 PROCEDURE — G8988 SELF CARE GOAL STATUS: HCPCS | Mod: CJ

## 2018-10-30 PROCEDURE — G8987 SELF CARE CURRENT STATUS: HCPCS | Mod: CK

## 2018-10-30 PROCEDURE — G8979 MOBILITY GOAL STATUS: HCPCS | Mod: CH

## 2018-10-30 PROCEDURE — 37000009 HC ANESTHESIA EA ADD 15 MINS

## 2018-10-30 PROCEDURE — C1713 ANCHOR/SCREW BN/BN,TIS/BN: HCPCS

## 2018-10-30 PROCEDURE — 27201423 OPTIME MED/SURG SUP & DEVICES STERILE SUPPLY

## 2018-10-30 PROCEDURE — 97161 PT EVAL LOW COMPLEX 20 MIN: CPT

## 2018-10-30 DEVICE — TRAY TIBIAL CEMENT SZ 3 COBAL: Type: IMPLANTABLE DEVICE | Site: KNEE | Status: FUNCTIONAL

## 2018-10-30 DEVICE — PATELLA OVAL DOME 35MM: Type: IMPLANTABLE DEVICE | Site: KNEE | Status: FUNCTIONAL

## 2018-10-30 DEVICE — CEMENT BONE IMPLANT: Type: IMPLANTABLE DEVICE | Site: KNEE | Status: FUNCTIONAL

## 2018-10-30 DEVICE — COMPONENT FEM POST SZ 3 RIGHT: Type: IMPLANTABLE DEVICE | Site: KNEE | Status: FUNCTIONAL

## 2018-10-30 DEVICE — INSERT TIB STBL 3 X 12.5MM: Type: IMPLANTABLE DEVICE | Site: KNEE | Status: FUNCTIONAL

## 2018-10-30 RX ORDER — LIDOCAINE HYDROCHLORIDE 10 MG/ML
1 INJECTION, SOLUTION EPIDURAL; INFILTRATION; INTRACAUDAL; PERINEURAL ONCE
Status: DISCONTINUED | OUTPATIENT
Start: 2018-10-30 | End: 2018-10-30

## 2018-10-30 RX ORDER — SODIUM CHLORIDE 0.9 % (FLUSH) 0.9 %
5 SYRINGE (ML) INJECTION
Status: DISCONTINUED | OUTPATIENT
Start: 2018-10-30 | End: 2018-11-02 | Stop reason: HOSPADM

## 2018-10-30 RX ORDER — CEFAZOLIN SODIUM 1 G/50ML
2 SOLUTION INTRAVENOUS
Status: COMPLETED | OUTPATIENT
Start: 2018-10-30 | End: 2018-10-31

## 2018-10-30 RX ORDER — ONDANSETRON 2 MG/ML
4 INJECTION INTRAMUSCULAR; INTRAVENOUS EVERY 8 HOURS PRN
Status: DISCONTINUED | OUTPATIENT
Start: 2018-10-30 | End: 2018-11-02 | Stop reason: HOSPADM

## 2018-10-30 RX ORDER — FAMOTIDINE 20 MG/1
20 TABLET, FILM COATED ORAL 2 TIMES DAILY
Status: DISCONTINUED | OUTPATIENT
Start: 2018-10-30 | End: 2018-11-02 | Stop reason: HOSPADM

## 2018-10-30 RX ORDER — ONDANSETRON 2 MG/ML
4 INJECTION INTRAMUSCULAR; INTRAVENOUS DAILY PRN
Status: DISCONTINUED | OUTPATIENT
Start: 2018-10-30 | End: 2018-10-30

## 2018-10-30 RX ORDER — SODIUM CHLORIDE, SODIUM LACTATE, POTASSIUM CHLORIDE, CALCIUM CHLORIDE 600; 310; 30; 20 MG/100ML; MG/100ML; MG/100ML; MG/100ML
INJECTION, SOLUTION INTRAVENOUS CONTINUOUS
Status: DISCONTINUED | OUTPATIENT
Start: 2018-10-30 | End: 2018-10-30

## 2018-10-30 RX ORDER — DOCUSATE SODIUM 100 MG/1
100 CAPSULE, LIQUID FILLED ORAL EVERY 12 HOURS
Status: DISCONTINUED | OUTPATIENT
Start: 2018-10-30 | End: 2018-11-02 | Stop reason: HOSPADM

## 2018-10-30 RX ORDER — MUPIROCIN 20 MG/G
1 OINTMENT TOPICAL 2 TIMES DAILY
Status: DISCONTINUED | OUTPATIENT
Start: 2018-10-30 | End: 2018-11-02 | Stop reason: HOSPADM

## 2018-10-30 RX ORDER — METHOCARBAMOL 500 MG/1
500 TABLET, FILM COATED ORAL 3 TIMES DAILY PRN
Status: DISCONTINUED | OUTPATIENT
Start: 2018-10-30 | End: 2018-11-02 | Stop reason: HOSPADM

## 2018-10-30 RX ORDER — AMLODIPINE BESYLATE 5 MG/1
10 TABLET ORAL DAILY
Status: DISCONTINUED | OUTPATIENT
Start: 2018-10-30 | End: 2018-11-02 | Stop reason: HOSPADM

## 2018-10-30 RX ORDER — HYDROMORPHONE HYDROCHLORIDE 2 MG/ML
0.5 INJECTION, SOLUTION INTRAMUSCULAR; INTRAVENOUS; SUBCUTANEOUS EVERY 5 MIN PRN
Status: DISCONTINUED | OUTPATIENT
Start: 2018-10-30 | End: 2018-10-30

## 2018-10-30 RX ORDER — SODIUM CHLORIDE, SODIUM LACTATE, POTASSIUM CHLORIDE, CALCIUM CHLORIDE 600; 310; 30; 20 MG/100ML; MG/100ML; MG/100ML; MG/100ML
INJECTION, SOLUTION INTRAVENOUS CONTINUOUS PRN
Status: DISCONTINUED | OUTPATIENT
Start: 2018-10-30 | End: 2018-10-30

## 2018-10-30 RX ORDER — PROPOFOL 10 MG/ML
VIAL (ML) INTRAVENOUS CONTINUOUS PRN
Status: DISCONTINUED | OUTPATIENT
Start: 2018-10-30 | End: 2018-10-30

## 2018-10-30 RX ORDER — LOSARTAN POTASSIUM AND HYDROCHLOROTHIAZIDE 12.5; 1 MG/1; MG/1
1 TABLET ORAL DAILY
Status: DISCONTINUED | OUTPATIENT
Start: 2018-10-30 | End: 2018-10-30 | Stop reason: CLARIF

## 2018-10-30 RX ORDER — HYDROCHLOROTHIAZIDE 12.5 MG/1
12.5 TABLET ORAL DAILY
Status: DISCONTINUED | OUTPATIENT
Start: 2018-10-30 | End: 2018-11-02 | Stop reason: HOSPADM

## 2018-10-30 RX ORDER — ROPIVACAINE HYDROCHLORIDE 5 MG/ML
INJECTION, SOLUTION EPIDURAL; INFILTRATION; PERINEURAL
Status: DISCONTINUED | OUTPATIENT
Start: 2018-10-30 | End: 2018-10-30

## 2018-10-30 RX ORDER — CEFAZOLIN SODIUM 2 G/50ML
2 SOLUTION INTRAVENOUS
Status: DISCONTINUED | OUTPATIENT
Start: 2018-10-30 | End: 2018-10-30

## 2018-10-30 RX ORDER — BUPIVACAINE HYDROCHLORIDE 7.5 MG/ML
INJECTION, SOLUTION EPIDURAL; RETROBULBAR
Status: COMPLETED | OUTPATIENT
Start: 2018-10-30 | End: 2018-10-30

## 2018-10-30 RX ORDER — LOSARTAN POTASSIUM 50 MG/1
100 TABLET ORAL DAILY
Status: DISCONTINUED | OUTPATIENT
Start: 2018-10-30 | End: 2018-11-02 | Stop reason: HOSPADM

## 2018-10-30 RX ORDER — ASCORBIC ACID 500 MG
1000 TABLET ORAL DAILY
Status: DISCONTINUED | OUTPATIENT
Start: 2018-10-30 | End: 2018-11-02 | Stop reason: HOSPADM

## 2018-10-30 RX ORDER — SODIUM CHLORIDE 0.9 % (FLUSH) 0.9 %
3 SYRINGE (ML) INJECTION
Status: DISCONTINUED | OUTPATIENT
Start: 2018-10-30 | End: 2018-10-30

## 2018-10-30 RX ORDER — HYDROMORPHONE HYDROCHLORIDE 2 MG/ML
0.2 INJECTION, SOLUTION INTRAMUSCULAR; INTRAVENOUS; SUBCUTANEOUS
Status: DISCONTINUED | OUTPATIENT
Start: 2018-10-30 | End: 2018-10-31 | Stop reason: SDUPTHER

## 2018-10-30 RX ORDER — CELECOXIB 100 MG/1
200 CAPSULE ORAL DAILY
Status: DISCONTINUED | OUTPATIENT
Start: 2018-10-30 | End: 2018-11-02 | Stop reason: HOSPADM

## 2018-10-30 RX ORDER — POTASSIUM CHLORIDE 20 MEQ/1
20 TABLET, EXTENDED RELEASE ORAL DAILY
Status: DISCONTINUED | OUTPATIENT
Start: 2018-10-30 | End: 2018-11-02 | Stop reason: HOSPADM

## 2018-10-30 RX ORDER — HYDROMORPHONE HYDROCHLORIDE 2 MG/ML
INJECTION, SOLUTION INTRAMUSCULAR; INTRAVENOUS; SUBCUTANEOUS
Status: DISCONTINUED | OUTPATIENT
Start: 2018-10-30 | End: 2018-10-30

## 2018-10-30 RX ORDER — ZOLPIDEM TARTRATE 5 MG/1
5 TABLET ORAL NIGHTLY PRN
Status: DISCONTINUED | OUTPATIENT
Start: 2018-10-30 | End: 2018-10-30 | Stop reason: ALTCHOICE

## 2018-10-30 RX ORDER — SODIUM CHLORIDE 0.9 % (FLUSH) 0.9 %
3 SYRINGE (ML) INJECTION EVERY 8 HOURS
Status: DISCONTINUED | OUTPATIENT
Start: 2018-10-30 | End: 2018-10-30

## 2018-10-30 RX ORDER — PNV NO.95/FERROUS FUM/FOLIC AC 28MG-0.8MG
100 TABLET ORAL DAILY
Status: DISCONTINUED | OUTPATIENT
Start: 2018-10-30 | End: 2018-11-02 | Stop reason: HOSPADM

## 2018-10-30 RX ORDER — TRAZODONE HYDROCHLORIDE 100 MG/1
100 TABLET ORAL NIGHTLY
Status: DISCONTINUED | OUTPATIENT
Start: 2018-10-30 | End: 2018-10-31

## 2018-10-30 RX ORDER — ACETAMINOPHEN 325 MG/1
650 TABLET ORAL EVERY 4 HOURS PRN
Status: DISCONTINUED | OUTPATIENT
Start: 2018-10-30 | End: 2018-11-02 | Stop reason: HOSPADM

## 2018-10-30 RX ORDER — BUPIVACAINE HCL/EPINEPHRINE 0.5-1:200K
VIAL (ML) INJECTION
Status: DISCONTINUED | OUTPATIENT
Start: 2018-10-30 | End: 2018-10-30 | Stop reason: HOSPADM

## 2018-10-30 RX ORDER — OXYCODONE HYDROCHLORIDE 5 MG/1
10 TABLET ORAL EVERY 4 HOURS PRN
Status: DISCONTINUED | OUTPATIENT
Start: 2018-10-30 | End: 2018-10-31

## 2018-10-30 RX ORDER — ERGOCALCIFEROL 1.25 MG/1
50000 CAPSULE ORAL
Status: DISCONTINUED | OUTPATIENT
Start: 2018-10-30 | End: 2018-11-02 | Stop reason: HOSPADM

## 2018-10-30 RX ORDER — RAMELTEON 8 MG/1
8 TABLET ORAL NIGHTLY PRN
Status: DISCONTINUED | OUTPATIENT
Start: 2018-10-30 | End: 2018-10-31

## 2018-10-30 RX ORDER — MIDAZOLAM HYDROCHLORIDE 1 MG/ML
INJECTION, SOLUTION INTRAMUSCULAR; INTRAVENOUS
Status: DISCONTINUED | OUTPATIENT
Start: 2018-10-30 | End: 2018-10-30

## 2018-10-30 RX ADMIN — CELECOXIB 200 MG: 100 CAPSULE ORAL at 12:10

## 2018-10-30 RX ADMIN — HYDROMORPHONE HYDROCHLORIDE 0.5 MG: 2 INJECTION INTRAMUSCULAR; INTRAVENOUS; SUBCUTANEOUS at 10:10

## 2018-10-30 RX ADMIN — MUPIROCIN 1 G: 20 OINTMENT TOPICAL at 12:10

## 2018-10-30 RX ADMIN — TRANEXAMIC ACID 1000 MG: 100 INJECTION, SOLUTION INTRAVENOUS at 09:10

## 2018-10-30 RX ADMIN — SODIUM CHLORIDE, SODIUM LACTATE, POTASSIUM CHLORIDE, AND CALCIUM CHLORIDE: .6; .31; .03; .02 INJECTION, SOLUTION INTRAVENOUS at 06:10

## 2018-10-30 RX ADMIN — VITAMIN B12 0.1 MG ORAL TABLET 100 MCG: 0.1 TABLET ORAL at 05:10

## 2018-10-30 RX ADMIN — SODIUM CHLORIDE, SODIUM LACTATE, POTASSIUM CHLORIDE, AND CALCIUM CHLORIDE: .6; .31; .03; .02 INJECTION, SOLUTION INTRAVENOUS at 07:10

## 2018-10-30 RX ADMIN — HYDROMORPHONE HYDROCHLORIDE 0.2 MG: 2 INJECTION INTRAMUSCULAR; INTRAVENOUS; SUBCUTANEOUS at 09:10

## 2018-10-30 RX ADMIN — TRANEXAMIC ACID 1000 MG: 100 INJECTION, SOLUTION INTRAVENOUS at 07:10

## 2018-10-30 RX ADMIN — FAMOTIDINE 20 MG: 20 TABLET ORAL at 08:10

## 2018-10-30 RX ADMIN — HYDROMORPHONE HYDROCHLORIDE 0.2 MG: 2 INJECTION INTRAMUSCULAR; INTRAVENOUS; SUBCUTANEOUS at 08:10

## 2018-10-30 RX ADMIN — MUPIROCIN 1 G: 20 OINTMENT TOPICAL at 08:10

## 2018-10-30 RX ADMIN — ROPIVACAINE HYDROCHLORIDE: 2 INJECTION, SOLUTION EPIDURAL; INFILTRATION at 02:10

## 2018-10-30 RX ADMIN — ONDANSETRON 4 MG: 2 INJECTION INTRAMUSCULAR; INTRAVENOUS at 03:10

## 2018-10-30 RX ADMIN — CEFAZOLIN SODIUM 2 G: 1 SOLUTION INTRAVENOUS at 05:10

## 2018-10-30 RX ADMIN — BUPIVACAINE HYDROCHLORIDE 1.6 ML: 7.5 INJECTION, SOLUTION EPIDURAL; RETROBULBAR at 08:10

## 2018-10-30 RX ADMIN — DOCUSATE SODIUM 100 MG: 100 CAPSULE, LIQUID FILLED ORAL at 08:10

## 2018-10-30 RX ADMIN — TRAZODONE HYDROCHLORIDE 100 MG: 100 TABLET ORAL at 08:10

## 2018-10-30 RX ADMIN — PROPOFOL 60 MCG/KG/MIN: 10 INJECTION, EMULSION INTRAVENOUS at 07:10

## 2018-10-30 RX ADMIN — CEFAZOLIN SODIUM 2 G: 2 SOLUTION INTRAVENOUS at 07:10

## 2018-10-30 RX ADMIN — ROPIVACAINE HYDROCHLORIDE 10 ML: 5 INJECTION, SOLUTION EPIDURAL; INFILTRATION; PERINEURAL at 10:10

## 2018-10-30 RX ADMIN — POTASSIUM CHLORIDE 20 MEQ: 20 TABLET, EXTENDED RELEASE ORAL at 05:10

## 2018-10-30 RX ADMIN — RIVAROXABAN 10 MG: 10 TABLET, FILM COATED ORAL at 05:10

## 2018-10-30 RX ADMIN — OXYCODONE HYDROCHLORIDE 10 MG: 5 TABLET ORAL at 07:10

## 2018-10-30 RX ADMIN — DOCUSATE SODIUM 100 MG: 100 CAPSULE, LIQUID FILLED ORAL at 05:10

## 2018-10-30 RX ADMIN — MIDAZOLAM 2 MG: 1 INJECTION INTRAMUSCULAR; INTRAVENOUS at 07:10

## 2018-10-30 NOTE — PROGRESS NOTES
Pt arrived to unit from PACU. Admission questions completed by VN. Introduced self as VN for this shift. Educated pt on VTE risk, safety precautions, and VN's role in pt care. Opportunity given for pt's questions. All questions answered.
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
## 2018-10-30 NOTE — ANESTHESIA PROCEDURE NOTES
Peripheral    Patient location during procedure: pre-op   Block not for primary anesthetic.  Reason for block: at surgeon's request and post-op pain management   Post-op Pain Location: right knee  Start time: 10/30/2018 10:31 AM  Timeout: 10/30/2018 10:31 AM   End time: 10/30/2018 10:35 AM  Surgery related to: right knee  Staffing  Anesthesiologist: Kush Burns MD  Performed: anesthesiologist   Preanesthetic Checklist  Completed: patient identified, site marked, surgical consent, pre-op evaluation, timeout performed, IV checked, risks and benefits discussed and monitors and equipment checked  Peripheral Block  Patient position: supine  Prep: ChloraPrep and site prepped and draped  Patient monitoring: heart rate, cardiac monitor, continuous pulse ox, continuous capnometry and frequent blood pressure checks  Block type: adductor canal  Laterality: right  Injection technique: continuous  Needle  Needle type: Tuohy   Needle gauge: 17 G  Needle length: 3.5 in  Needle localization: anatomical landmarks and ultrasound guidance  Catheter type: spring wound  Catheter size: 19 G  Test dose: lidocaine 1.5% with Epi 1-to-200,000 and negative   -ultrasound image captured on disc.  Assessment  Injection assessment: negative aspiration, negative parasthesia and local visualized surrounding nerve  Paresthesia pain: none  Heart rate change: no  Slow fractionated injection: yes  Additional Notes  VSS.  [facility] RN monitoring vitals throughout procedure.  Patient tolerated procedure well.
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
## 2018-10-30 NOTE — ANESTHESIA PROCEDURE NOTES
Spinal    Diagnosis: Knee Arthroplasty  Patient location during procedure: OR  Start time: 10/30/2018 7:35 AM  Timeout: 10/30/2018 7:30 AM  End time: 10/30/2018 7:35 AM  Staffing  Anesthesiologist: Kush Burns MD  Resident/CRNA: Tristen Bullock MD  Other anesthesia staff: Lev Roman MD  Performed: other anesthesia staff   Preanesthetic Checklist  Completed: patient identified, site marked, surgical consent, pre-op evaluation, timeout performed, IV checked, risks and benefits discussed and monitors and equipment checked  Spinal Block  Patient position: sitting  Prep: ChloraPrep  Patient monitoring: heart rate, cardiac monitor and continuous pulse ox  Approach: midline  Location: L2-3  Injection technique: single shot  CSF Fluid: clear free-flowing CSF  Needle  Needle type: pencil-tip   Needle gauge: 22 G  Needle length: 3.5 in  Additional Documentation: incremental injection  Needle localization: anatomical landmarks  Assessment  Sensory level: T10   Dermatomal levels determined by alcohol wipe  Ease of block: easy  Patient's tolerance of the procedure: comfortable throughout block

## 2018-10-30 NOTE — PROGRESS NOTES
Afternoon round completed. Reinforced previous teachings. Opportunity given for pt's questions. Pt asked if she could sleep on her side, being as this is how she sleeps at home. VN advised pt to ask floor nurse or PCT for extra pillows to support the operative leg as pt slept.

## 2018-10-30 NOTE — OP NOTE
DATE OF PROCEDURE:  10/30/2018.    PREOPERATIVE DIAGNOSES:  1.  DJD, right knee.  2.  Painful retained hardware of right proximal tibia.    POSTOPERATIVE DIAGNOSES:  1.  DJD, right knee.  2.  Painful retained hardware of right proximal tibia.    PROCEDURES PERFORMED:  1.  Removal of hardware, right proximal tibia.  2.  Right total knee arthroplasty.    SURGEON:  Sohan Colvin M.D.    ANESTHESIA:  Spinal.    ESTIMATED BLOOD LOSS:  25 mL.    SPECIMENS:  Hardware to Pathology.    COMPLICATIONS:  None.    INDICATIONS:  Ms. Lerma is a 73-year-old woman with advanced DJD of the right   knee.  She is remotely treated for proximal tibial fracture with ORIF and has   painful hardware lateral that is also prominent and in the way of her knee   replacement.  She is admitted for the above procedures, understood risks,   benefits, and alternatives of surgery as noted in the consent form.  Surgical   site marking was performed in the holding area prior to anesthesia.  Timeout was   performed in the Operating Room prior to making skin incision.    PROCEDURE IN DETAIL:  The patient was taken to the Operating Room, placed on the   operating room table in supine position.  After an adequate level of anesthesia   was obtained, right lower extremity was prepped and draped in usual sterile   fashion.  Preoperative prophylactic IV Ancef and tranexamic acid were given.    Timeout was performed.  The leg was exsanguinated and tourniquet about the   proximal thigh inflated to 225 mmHg.  The patient had an anterior lateral scar   from her prior surgery; however, as it was far lateral, a new anterior midline   incision was made sharply with the scalpel and carried sharply through   subcutaneous tissue.  Medial parapatellar incision was made and patella was   prepared for resurfacing by removing 8 mm of bone and cartilage and holes   drilled for a 35 mm oval patella.  Metal patellar protector was inserted,   synovitis in the suprapatellar  Noted, will watch for form.   pouch and medial and lateral gutters were   resected.  The knee was flexed and remnants of the menisci, ACL and PCL were   resected.  Starting hole was made with a drill in distal femur and the femoral   intramedullary alignment guide was inserted.  Distal femoral cut with 5 degree   valgus, right and an 11 mm distal femur was performed and then the AP sizer was   applied, size 3 gave the best fit and size 3 AP chamfer cutting block was   applied.  Osteophytes medially, laterally and posteriorly resected and femoral   box cut was performed.  Next, using the external tibial alignment guide,   proximal tibial cut was made based off for removal of 2 mm off the medial defect   side, which was moderately deficient.  The cut extended to the screws laterally   and the cut portion of bone was removed and then the lateral proximal screws   were removed through this incision using a drill.  The remainder of the proximal   tibial cut was made with an oscillating saw.  Next, a separate lateral incision   was made through the fascia down to the level of the plate, taking care to   protect the cutaneous nerves.  Using C-arm, the distal screws were identified   and removed with the drill and then the plate was removed after elevating.    Next, the knee was trialled with a 3 PS femur, size 3 tibial baseplate.  Medial   soft tissue release on the tibia was performed in order to balance the knee and   then osteophytes medially were resected.  The knee was most stable and balanced   with a 12.5 mm thick poly insert and the patella tracked well in the groove   without need for lateral retinacular release.  Trial components were removed.    Holes were drilled for the tibial keel and then joint surfaces were copiously   irrigated with pulsatile lavage and dried.  One batch of methylmethacrylate   cement was mixed under vacuum and then cement was pressurized along the proximal   tibia and distal femur and the femoral and tibial components  were cemented in   place.  Any residual excess cement was removed.  The 12.5 mm thick PS poly   insert was impacted on tibial tray and then the patellar component was cemented   and residual excess cement was removed.  The joint was then copiously irrigated   with saline containing Betadine.  The fascia was repaired in an interrupted   figure-of-eight fashion with #1 Vicryl, subQ was closed with 2-0 Vicryl and the   skin in a running subcuticular fashion with 3-0 Stratafix.  Prior to closing the   incision, the fascia laterally was likewise repaired with #1 Vicryl.  Prineo   tape with Dermabond was then applied over the incision.  Tourniquet was   deflated.  The patient was awakened, moved to stretcher and taken to Recovery   Room in stable condition.  No complications.    PLAN:  Ice, elevation, neurovascular checks, pain control.    COMPONENTS UTILIZED:  WemoLabuy PFC Sigma total knee system with size 3 right cement   PS femur, size 3 fixed-bearing tibial tray, size 3 x 12.5 mm thick poly tibial   insert and 35 mm oval patella.      TATI/IN  dd: 10/30/2018 10:44:51 (CDT)  td: 10/30/2018 11:08:59 (CDT)  Doc ID   #[id number]  Job ID #[id number]    CC:

## 2018-10-30 NOTE — PROGRESS NOTES
Changed order from Zolpidem to Ramelteon 8mg per P&T protocol based on pt age. It is shown to be a safer option for patients 65 years and older because there is a decrease in fall risks.     Heather Maldonado, PharmD  [phone]

## 2018-10-30 NOTE — PLAN OF CARE
Problem: Occupational Therapy Goal  Goal: Occupational Therapy Goal  Goals to be met by: 11/30/18     Patient will increase functional independence with ADLs by performing:    LE Dressing with Supervision.  Grooming while standing with Supervision.  Toileting from toilet with Supervision for hygiene and clothing management.   Supine to sit with Supervision.  Step transfer with Supervision  Toilet transfer to toilet with Supervision.    Outcome: Ongoing (interventions implemented as appropriate)  Pt would benefit from cont OT services in order to maximize functional independence. Recommending HHOT/PT at d/c. Appears to have DME in place at home.

## 2018-10-30 NOTE — PLAN OF CARE
Problem: Patient Care Overview  Goal: Plan of Care Review  Outcome: Ongoing (interventions implemented as appropriate)  Pain well controlled with on Q-ball. Right knee with dressing in place and swelling. 2+ pedal pulse ; pt able to wiggle toes.   Pt turning in bed per self. SCD bilaterally.

## 2018-10-30 NOTE — TRANSFER OF CARE
"Anesthesia Transfer of Care Note    Patient: Betzaida Lerma    Procedure(s) Performed: Procedure(s) (LRB):  ARTHROPLASTY, KNEE (Right)  REMOVAL, HARDWARE (Right)    Patient location: PACU    Anesthesia Type: MAC and spinal    Transport from OR: Transported from OR on 6-10 L/min O2 by face mask with adequate spontaneous ventilation    Post pain: adequate analgesia    Post assessment: no apparent anesthetic complications    Post vital signs: stable    Level of consciousness: awake, alert and oriented    Nausea/Vomiting: no nausea/vomiting    Complications: none    Transfer of care protocol was followed      Last vitals:   Visit Vitals  BP (!) 122/78 (BP Location: Left arm, Patient Position: Lying)   Pulse 72   Temp 36.9 °C (98.4 °F) (Skin)   Resp 18   Ht 5' 6" (1.676 m)   Wt 98.4 kg (217 lb)   SpO2 96%   Breastfeeding? No   BMI 35.02 kg/m²     "

## 2018-10-30 NOTE — ANESTHESIA POSTPROCEDURE EVALUATION
"Anesthesia Post Evaluation    Patient: Betzaida Lerma    Procedure(s) Performed: Procedure(s) (LRB):  ARTHROPLASTY, KNEE (Right)  REMOVAL, HARDWARE (Right)    Final Anesthesia Type: spinal  Patient location during evaluation: PACU  Patient participation: Yes- Able to Participate  Level of consciousness: awake and alert, oriented and awake  Post-procedure vital signs: reviewed and stable  Pain management: adequate  Airway patency: patent  PONV status at discharge: No PONV  Anesthetic complications: no      Cardiovascular status: blood pressure returned to baseline  Respiratory status: unassisted and room air  Hydration status: euvolemic  Follow-up not needed.        Visit Vitals  BP (!) 153/72   Pulse 68   Temp 36.6 °C (97.9 °F) (Temporal)   Resp 16   Ht 5' 6" (1.676 m)   Wt 101.8 kg (224 lb 6.9 oz)   SpO2 (!) 94%   Breastfeeding? No   BMI 36.22 kg/m²       Pain/Samm Score: Pain Assessment Performed: Yes (10/30/2018 12:00 PM)  Presence of Pain: complains of pain/discomfort (10/30/2018 12:00 PM)  Pain Rating Prior to Med Admin: 0 (10/30/2018 12:02 PM)  Samm Score: 10 (10/30/2018 12:00 PM)        "

## 2018-10-30 NOTE — PLAN OF CARE
SIGNED OUT FROM PACU PER dR Greer. REPORT CALLED TO ABELARDO DUMONT/5A. TRANSPORTED TO ROOM 508 VIA [facility] UPX[facility].

## 2018-10-30 NOTE — PLAN OF CARE
Problem: Physical Therapy Goal  Goal: Physical Therapy Goal  Goals to be met by: 2018     Patient will increase functional independence with mobility by performin. Supine to sit with Modified [place]  2. Sit to stand transfer with Modified [place]  3. Gait  x 100 feet with Modified [place] using Rolling Walker.     Outcome: Ongoing (interventions implemented as appropriate)  Recommend Home with Home Health  No apparent DME needs noted

## 2018-10-30 NOTE — BRIEF OP NOTE
[facility]  Brief Operative Note    SUMMARY     Surgery Date: 10/30/2018     Surgeon(s) and Role:     * Sohan Colvin MD - Primary    Assisting Surgeon: None    Pre-op Diagnosis:  Primary osteoarthritis of right knee [M17.11]    Post-op Diagnosis:  Post-Op Diagnosis Codes:     * Primary osteoarthritis of right knee [M17.11]      Painful retained hardware  Procedure(s) (LRB):  ARTHROPLASTY, KNEE (Right)  REMOVAL, HARDWARE (Right)    Anesthesia: Spinal    Description of Procedure: Rt TKA and hardware removal    Description of the findings of the procedure: Prominent lateral hardware. Advanced medial and PF DJD    Estimated Blood Loss: 25 mL         Specimens:   Specimen (12h ago, onward)    Start     Ordered    10/30/18 1013  Specimen to Pathology - Surgery  Once     Comments:  Operating Room: [facility] OR 05Pre-Op Diagnosis: Primary osteoarthritis of right knee [M17.11]Procedures:   Procedure(s) (LRB):ARTHROPLASTY, KNEE (Right)REMOVAL, HARDWARE (Right)Surgeon(s) and Role:   * Sohan Colvin MD - PrimarySurgery Date:  10/30/2018Surgery #:  [id number]. Hardware removal right knee- gross (SAVE FOR PATIENT)     Start Status   10/30/18 1013 Collected (10/30/18 1013)       10/30/18 1013    10/30/18 0937  Specimen to Pathology - Surgery  Once     Comments:  Operating Room: [facility] OR 05Pre-Op Diagnosis: Primary osteoarthritis of right knee [M17.11]Procedures:   Procedure(s) (LRB):ARTHROPLASTY, KNEE (Right)REMOVAL, HARDWARE (Right)Surgeon(s) and Role:   * Sohan Colvin MD - PrimarySurgery Date:  10/30/2018Surgery #:  [id number]. Hardware removal right knee- gross     Start Status   10/30/18 0937 Collected (10/30/18 0937)       10/30/18 0937

## 2018-10-31 LAB — HCT VFR BLD AUTO: 35 %

## 2018-10-31 PROCEDURE — 25000003 PHARM REV CODE 250

## 2018-10-31 PROCEDURE — 97535 SELF CARE MNGMENT TRAINING: CPT

## 2018-10-31 PROCEDURE — 97530 THERAPEUTIC ACTIVITIES: CPT

## 2018-10-31 PROCEDURE — 36415 COLL VENOUS BLD VENIPUNCTURE: CPT

## 2018-10-31 PROCEDURE — 94799 UNLISTED PULMONARY SVC/PX: CPT

## 2018-10-31 PROCEDURE — 85014 HEMATOCRIT: CPT

## 2018-10-31 PROCEDURE — 63600175 PHARM REV CODE 636 W HCPCS

## 2018-10-31 PROCEDURE — 99900035 HC TECH TIME PER 15 MIN (STAT)

## 2018-10-31 PROCEDURE — 97110 THERAPEUTIC EXERCISES: CPT

## 2018-10-31 RX ORDER — KETOROLAC TROMETHAMINE 30 MG/ML
15 INJECTION, SOLUTION INTRAMUSCULAR; INTRAVENOUS EVERY 6 HOURS PRN
Status: DISCONTINUED | OUTPATIENT
Start: 2018-10-31 | End: 2018-11-02 | Stop reason: HOSPADM

## 2018-10-31 RX ORDER — OXYCODONE AND ACETAMINOPHEN 5; 325 MG/1; MG/1
1 TABLET ORAL EVERY 4 HOURS PRN
Qty: 60 TABLET | Refills: 0
Start: 2018-10-31 | End: 2018-11-01 | Stop reason: HOSPADM

## 2018-10-31 RX ORDER — HYDROCODONE BITARTRATE AND ACETAMINOPHEN 5; 325 MG/1; MG/1
1 TABLET ORAL EVERY 4 HOURS PRN
Status: DISCONTINUED | OUTPATIENT
Start: 2018-10-31 | End: 2018-10-31

## 2018-10-31 RX ORDER — HYDROMORPHONE HYDROCHLORIDE 1 MG/ML
0.2 INJECTION, SOLUTION INTRAMUSCULAR; INTRAVENOUS; SUBCUTANEOUS
Status: DISCONTINUED | OUTPATIENT
Start: 2018-10-31 | End: 2018-10-31

## 2018-10-31 RX ORDER — NALOXONE HCL 0.4 MG/ML
0.2 VIAL (ML) INJECTION
Status: DISCONTINUED | OUTPATIENT
Start: 2018-10-31 | End: 2018-11-02 | Stop reason: HOSPADM

## 2018-10-31 RX ORDER — OXYCODONE HYDROCHLORIDE 5 MG/1
5 TABLET ORAL EVERY 4 HOURS PRN
Status: DISCONTINUED | OUTPATIENT
Start: 2018-10-31 | End: 2018-10-31

## 2018-10-31 RX ORDER — KETOROLAC TROMETHAMINE 10 MG/1
10 TABLET, FILM COATED ORAL EVERY 6 HOURS PRN
Status: DISCONTINUED | OUTPATIENT
Start: 2018-10-31 | End: 2018-11-02 | Stop reason: HOSPADM

## 2018-10-31 RX ADMIN — DOCUSATE SODIUM 100 MG: 100 CAPSULE, LIQUID FILLED ORAL at 09:10

## 2018-10-31 RX ADMIN — FAMOTIDINE 20 MG: 20 TABLET ORAL at 09:10

## 2018-10-31 RX ADMIN — CEFAZOLIN SODIUM 2 G: 1 SOLUTION INTRAVENOUS at 12:10

## 2018-10-31 RX ADMIN — MUPIROCIN 1 G: 20 OINTMENT TOPICAL at 09:10

## 2018-10-31 RX ADMIN — CELECOXIB 200 MG: 100 CAPSULE ORAL at 10:10

## 2018-10-31 RX ADMIN — POTASSIUM CHLORIDE 20 MEQ: 20 TABLET, EXTENDED RELEASE ORAL at 10:10

## 2018-10-31 RX ADMIN — HYDROCHLOROTHIAZIDE 12.5 MG: 12.5 TABLET ORAL at 10:10

## 2018-10-31 RX ADMIN — AMLODIPINE BESYLATE 10 MG: 5 TABLET ORAL at 10:10

## 2018-10-31 RX ADMIN — VITAMIN B12 0.1 MG ORAL TABLET 100 MCG: 0.1 TABLET ORAL at 10:10

## 2018-10-31 RX ADMIN — FAMOTIDINE 20 MG: 20 TABLET ORAL at 10:10

## 2018-10-31 RX ADMIN — RIVAROXABAN 10 MG: 10 TABLET, FILM COATED ORAL at 06:10

## 2018-10-31 RX ADMIN — DOCUSATE SODIUM 100 MG: 100 CAPSULE, LIQUID FILLED ORAL at 10:10

## 2018-10-31 RX ADMIN — NALOXONE HYDROCHLORIDE 0.2 MG: 0.4 INJECTION, SOLUTION INTRAMUSCULAR; INTRAVENOUS; SUBCUTANEOUS at 04:10

## 2018-10-31 RX ADMIN — LOSARTAN POTASSIUM 100 MG: 50 TABLET, FILM COATED ORAL at 10:10

## 2018-10-31 NOTE — PLAN OF CARE
Problem: Patient Care Overview  Goal: Plan of Care Review  Patient stable overnight with no complaints.  at bedside through out the night very attentive and helpful in care. Patient given I Pad during evening to use for virtual nurse care. Pain controlled overnight. Received pain pill before change of shift last night , has not requested pain medication since. Has slept all night. Patient got OOB to use bedside commode. Patient required assistance of two people and walker. Last bm on 10/29. Voiding without difficulty. Right knee wrapped in ace bandage, slight swelling noted around knee area. Patient reports no tingling or numbness. Pillow placed under heel and ice pack used intermittently during the night. On Q pain ball intact to right knee. Safety ensured. Call light within reach. Bed alarm on

## 2018-10-31 NOTE — PT/OT/SLP PROGRESS
"Occupational Therapy   Treatment    Name: Betzaida Lerma  MRN: [id number]  Admitting Diagnosis:  Right knee DJD  1 Day Post-Op    Recommendations:     Discharge Recommendations: home health PT, home health OT(Patient needs to progress if D/C /c HH OT/PT)  Discharge Equipment Recommendations:  none  Barriers to discharge:  None    Subjective     Communicated with: Priscilla reeves prior to session.  Pain/Comfort:  · Pain Rating 1: (endorses R knee pain with movement)    Patients cultural, spiritual, Judaism conflicts given the current situation: (none reported)    Objective:     Patient found with: SCD, peripheral IV    General Precautions: Standard, fall   Orthopedic Precautions:RLE weight bearing as tolerated   Braces: N/A     Bed Mobility:    · Patient completed Rolling/Turning to Right with moderate assistance and with side rail  · Patient completed Scooting/Bridging with moderate assistance and increased VCs and time  · Patient completed Supine to Sit with moderate assistance, with side rail and maximum cues for technique  · Patient completed Sit to Supine with maximal assistance and 2 persons     Functional Mobility/Transfers:  · Patient completed Sit <> Stand Transfer with moderate assistance and of 2 persons  with  rolling walker     Activities of Daily Living:  · Grooming: set-up of G/H tasks EOB    · Toileting: moderate assistance to use bedpan; patient able to (A) with front hygiene    Patient left HOB elevated with all lines intact, call button in reach, bed alarm on, nurse notified and  present    AMPA 6 Click:  AMPA Total Score: 15    Treatment & Education:  Bed mob as noted above. Increased time for processing simple motor commands. Patient sat EOB with SBA-min (A) 2* "floating" LLE up and causing patient to lean posteriorly at trunk. Patient instructed in sit <> stand using RW with mod (A) of 2. Increased difficulty to come to full upright -- required (A) at hips for extension. Patient stepped " "fwd x 2 with mod (A) of 2, unable to step bwd, despite max VCs. Bed had to be brought right behind patient's legs for safety. G/H tasks from EOB. Patient returned supine and requested to "walk to the bathroom". Patient and spouse educated on using bedpan at this time 2* safety.   Education:    Assessment:   Patient still presents with "foggy/cloudy" cognition. Jerk-like tremors occasionally in session. Patient with increased difficulty processing simple commands. Patient stepped forward x 2, but unable to take steps backward; therapist had to move bed close to patient. Patient will require increased (A) at this point and will benefit from continued skilled OT to address functional deficits.     Betzaida Lerma is a 73 y.o. female with a medical diagnosis of Right knee DJD. Performance deficits affecting function are weakness, impaired endurance, impaired self care skills, impaired functional mobilty, gait instability, impaired balance, impaired cognition, decreased coordination, decreased upper extremity function, decreased lower extremity function, decreased safety awareness, impaired coordination.      Rehab Prognosis:  Good; patient would benefit from acute skilled OT services to address these deficits and reach maximum level of function.       Plan:     Patient to be seen 5 x/week to address the above listed problems via self-care/home management, therapeutic activities, therapeutic exercises  · Plan of Care Expires: 11/30/18  · Plan of Care Reviewed with: patient, spouse    This Plan of care has been discussed with the patient who was involved in its development and understands and is in agreement with the identified goals and treatment plan    GOALS:   Multidisciplinary Problems     Occupational Therapy Goals        Problem: Occupational Therapy Goal    Goal Priority Disciplines Outcome Interventions   Occupational Therapy Goal     OT, PT/OT Ongoing (interventions implemented as appropriate)    Description:  " Goals to be met by: 11/30/18     Patient will increase functional independence with ADLs by performing:    LE Dressing with Supervision.  Grooming while standing with Supervision.  Toileting from toilet with Supervision for hygiene and clothing management.   Supine to sit with Supervision.  Step transfer with Supervision  Toilet transfer to toilet with Supervision.                      Time Tracking:     OT Date of Treatment: 10/31/18  OT Start Time: 1340  OT Stop Time: 1420  OT Total Time (min): 40 mins co-tx with PTA    Billable Minutes:Self Care/Home Management 25    AUGUSTUS Ritchie  10/31/2018

## 2018-10-31 NOTE — PLAN OF CARE
Tn met with pt and  Antonio     s/p ARTHROPLASTY, KNEE (Right)  REMOVAL, HARDWARE (Right) -- 10/30      dx:  R knee DJD     This  put name on white board and explained blue discharge folder to patient. Discharge planning brochure and/or business card given to patient.  Patient verbalized understanding.    pt will need hh at d/c   Follow-Up       Follow-up With  Details  Why  Contact Info   Sohan Colvin MD  On 11/14/2018  10:00 am   [address]  [address]  [facility] [address]  [phone]               10/31/18 1730   Discharge Assessment   Assessment Type Discharge Planning Assessment   Confirmed/corrected address and phone number on facesheet? Yes   Assessment information obtained from? Caregiver;Medical Record   Expected Length of Stay (days) 2   Communicated expected length of stay with patient/caregiver yes   Prior to hospitilization cognitive status: Alert/Oriented   Prior to hospitalization functional status: Independent   Current cognitive status: Alert/Oriented   Current Functional Status: Assistive Equipment   Lives With spouse   Able to Return to Prior Arrangements yes   Is patient able to care for self after discharge? Unable to determine at this time (comments)   Who are your caregiver(s) and their phone number(s)? ( Antonio  [phone] )   Patient's perception of discharge disposition home or selfcare   Readmission Within The Last 30 Days no previous admission in last 30 days   Patient currently being followed by outpatient case management? No   Patient currently receives any other outside agency services? No   Equipment Currently Used at Home walker, rolling;bedside commode   Do you have any problems affording any of your prescribed medications? TBD   Does the patient have transportation home? Yes   Transportation Available family or friend will provide   Does the patient receive services at the [facility]? No   Discharge Plan A Home;Home with  family;Home Health   Patient/Family In Agreement With Plan yes

## 2018-10-31 NOTE — PT/OT/SLP PROGRESS
Physical Therapy Treatment    Patient Name:  Betzaida Lerma   MRN:  [id number]    Recommendations:     Discharge Recommendations:  home health PT, home health OT   Discharge Equipment Recommendations:     Barriers to discharge: decreased mobility,strength and ROM    Assessment:     Betzaida Lerma is a 73 y.o. female admitted with a medical diagnosis of Right knee DJD.  She presents with the following impairments/functional limitations:  weakness, impaired endurance, impaired functional mobilty, gait instability, impaired cognition, decreased upper extremity function, decreased lower extremity function, decreased safety awareness, pain, decreased ROM, impaired coordination, impaired skin, orthopedic precautions ,pt remains a little foggy from meds but improved from AM rx,requires A X 2 with standing and a few steps and is not ready for d/c home today,pt will benefit from  services upon discharge.    Rehab Prognosis:  Good; patient would benefit from acute skilled PT services to address these deficits and reach maximum level of function.      Recent Surgery: Procedure(s) (LRB):  ARTHROPLASTY, KNEE (Right)  REMOVAL, HARDWARE (Right) 1 Day Post-Op    Plan:     During this hospitalization, patient to be seen BID to address the above listed problems via gait training, therapeutic activities, therapeutic exercises  · Plan of Care Expires:  11/30/18   Plan of Care Reviewed with: patient, spouse    Subjective     Communicated with nsg prior to session.  Patient found supine upon PT entry to room, agreeable to treatment.      Chief Complaint: n/a  Patient comments/goals: pt feeling a little better  Pain/Comfort:  · Pain Rating 1: (no rating)  · Location - Side 1: Right  · Location - Orientation 1: generalized  · Location 1: knee(with movement)  · Pain Addressed 1: Reposition, Distraction  · Pain Rating Post-Intervention 1: 9/10    Patients cultural, spiritual, Catholic conflicts given the current situation:      Objective:      Patient found with: SCD     General Precautions: Standard, fall   Orthopedic Precautions:RLE weight bearing as tolerated   Braces: N/A     Functional Mobility:  · Bed Mobility:     · Rolling Right: moderate assistance  · Supine to Sit: moderate assistance and with handrail  · Sit to Supine: maximal assistance and of 2 persons  · Transfers:     · Sit to Stand:  moderate assistance and of 2 persons with rolling walker  · Gait: 2 steps up/back with Mod A X 2 and v/c's  · Balance: fair standing balance with RW      AM-PAC 6 CLICK MOBILITY  Turning over in bed (including adjusting bedclothes, sheets and blankets)?: 2  Sitting down on and standing up from a chair with arms (e.g., wheelchair, bedside commode, etc.): 2  Moving from lying on back to sitting on the side of the bed?: 2  Moving to and from a bed to a chair (including a wheelchair)?: 2  Need to walk in hospital room?: 2  Climbing 3-5 steps with a railing?: 1  Basic Mobility Total Score: 11       Therapeutic Activities and Exercises: le supine ex's X 10 reps with assistance on L in;: ap,qs,hs,abd/add,slr       Patient left supine with all lines intact, call button in reach, bed alarm on and spouse present..    GOALS: see general POC  Multidisciplinary Problems     Physical Therapy Goals        Problem: Physical Therapy Goal    Goal Priority Disciplines Outcome Goal Variances Interventions   Physical Therapy Goal     PT, PT/OT Ongoing (interventions implemented as appropriate)     Description:  Goals to be met by: 2018     Patient will increase functional independence with mobility by performin. Supine to sit with Modified [place]  2. Sit to stand transfer with Modified [place]  3. Gait  x 100 feet with Modified [place] using Rolling Walker.                       Time Tracking:     PT Received On: 10/31/18  PT Start Time: 1340     PT Stop Time: 1420  PT Total Time (min): 40 min     Billable Minutes: Therapeutic Activity 15 and Total  Time 40    Treatment Type: Treatment  PT/PTA: PTA     PTA Visit Number: 1     Phill Pierre, PTA  10/31/2018

## 2018-10-31 NOTE — PROGRESS NOTES
[facility]  Orthopedics  Progress Note    Patient Name: Betzaida Lerma  MRN: [id number]  Admission Date: 10/30/2018  Hospital Length of Stay: 1 days  Attending Provider: Sohan Colvin MD  Primary Care Provider: Concepcion Gonzáles MD  Follow-up For: Procedure(s) (LRB):  ARTHROPLASTY, KNEE (Right)  REMOVAL, HARDWARE (Right)    Post-Operative Day: 1 Day Post-Op  Subjective:     Principal Problem:Right knee DJD    Principal Orthopedic Problem: Post op Rt TKA and hardware removal    Interval History: Pain controlled.  Drowsy from pain pill.  Ambulating to BR    Review of patient's allergies indicates:  No Known Allergies    Current Facility-Administered Medications   Medication    acetaminophen tablet 650 mg    amLODIPine tablet 10 mg    ascorbic acid (vitamin C) tablet 1,000 mg    celecoxib capsule 200 mg    cyanocobalamin tablet 100 mcg    docusate sodium capsule 100 mg    ergocalciferol capsule 50,000 Units    famotidine tablet 20 mg    losartan tablet 100 mg    And    hydroCHLOROthiazide tablet 12.5 mg    hydromorphone (PF) injection 0.2 mg    methocarbamol tablet 500 mg    multivitamin tablet 1 tablet    mupirocin 2 % ointment 1 g    omega-3 fatty acids-fish oil 340-1,000 mg capsule 1 capsule    ondansetron injection 4 mg    oxyCODONE immediate release tablet 5 mg    potassium chloride SA CR tablet 20 mEq    promethazine (PHENERGAN) 6.25 mg in dextrose 5 % 50 mL IVPB    ramelteon tablet 8 mg    rivaroxaban tablet 10 mg    ropivacaine (NAROPIN) 0.2% ON-Q C-BLOC 550mL (med + pump) (SAF)    sodium chloride 0.9% flush 5 mL    traZODone tablet 100 mg     Objective:     Vital Signs (Most Recent):  Temp: 98.5 °F (36.9 °C) (10/31/18 0937)  Pulse: 75 (10/31/18 0937)  Resp: (!) 22 (10/31/18 0937)  BP: 137/64 (10/31/18 0937)  SpO2: (!) 90 % (10/31/18 0842) Vital Signs (24h Range):  Temp:  [97.9 °F (36.6 °C)-99 °F (37.2 °C)] 98.5 °F (36.9 °C)  Pulse:  [66-88] 75  Resp:  [14-22]  "22  SpO2:  [90 %-100 %] 90 %  BP: (122-181)/(56-85) 137/64     Weight: 101.4 kg (223 lb 8.7 oz)  Height: 5' 6" (167.6 cm)  Body mass index is 36.08 kg/m².      Intake/Output Summary (Last 24 hours) at 10/31/2018 0940  Last data filed at 10/31/2018 0503  Gross per 24 hour   Intake 600 ml   Output 675 ml   Net -75 ml       Ortho/SPM Exam   Incision intact,  No drainage.  Homans neg NVI  Heart-rrr, Lungs clear    Significant Labs:   CBC:   Recent Labs   Lab 10/31/18  0616   HCT 35.0*     All pertinent labs within the past 24 hours have been reviewed.    Significant Imaging: X-Ray: I have reviewed all pertinent results/findings and my personal findings are:  Post op Knee films stable    Assessment/Plan:     Active Diagnoses:    Diagnosis Date Noted POA    PRINCIPAL PROBLEM:  Right knee DJD [M17.11] 10/30/2018 Yes    Painful orthopaedic hardware [T84.84XA] 10/30/2018 Yes    Left knee DJD [M17.12] 10/30/2018 Yes    HTN (hypertension) [I10]  Yes      Problems Resolved During this Admission:   Post op Rt TKA and hardware removal    Plan- PTR and OT today, then discharge with [facility] PT      Sohan Colvin MD  Orthopedics  [facility]  "

## 2018-10-31 NOTE — PROGRESS NOTES
"ANGEL making evening rounds.  Patient is resting in bed at this time.  Denies pain.  Q ball in use.   is at bedside.  States, "I feel loopy but no pain.  I am ok."  Priscilla at bedside as well.  Will continue to monitor.  "

## 2018-10-31 NOTE — PLAN OF CARE
"Problem: Occupational Therapy Goal  Goal: Occupational Therapy Goal  Goals to be met by: 11/30/18     Patient will increase functional independence with ADLs by performing:    LE Dressing with Supervision.  Grooming while standing with Supervision.  Toileting from toilet with Supervision for hygiene and clothing management.   Supine to sit with Supervision.  Step transfer with Supervision  Toilet transfer to toilet with Supervision.     Outcome: Ongoing (interventions implemented as appropriate)  Patient still presents with "foggy/cloudy" cognition. Jerk-like tremors occasionally in session. Patient with increased difficulty processing simple commands. Patient stepped forward x 2, but unable to take steps backward; therapist had to move bed close to patient. Patient will require increased (A) at this point and will benefit from continued skilled OT to address functional deficits.       "

## 2018-10-31 NOTE — PLAN OF CARE
Problem: Physical Therapy Goal  Goal: Physical Therapy Goal  Goals to be met by: 2018     Patient will increase functional independence with mobility by performin. Supine to sit with Modified [place]  2. Sit to stand transfer with Modified [place]  3. Gait  x 100 feet with Modified [place] using Rolling Walker.      Outcome: Ongoing (interventions implemented as appropriate)  Goals ongoing

## 2018-10-31 NOTE — PT/OT/SLP PROGRESS
Physical Therapy Treatment    Patient Name:  Betzaida Lerma   MRN:  [id number]    Recommendations:     Discharge Recommendations:  home health PT, home health OT   Discharge Equipment Recommendations:     Barriers to discharge: decreased mobility,strength and endurance    Assessment:     Betzaida Lerma is a 73 y.o. female admitted with a medical diagnosis of Right knee DJD.  She presents with the following impairments/functional limitations:  weakness, impaired endurance, impaired functional mobilty, decreased lower extremity function, decreased upper extremity function, pain, decreased ROM, impaired coordination, impaired skin, orthopedic precautions ,pt remains groggy from PM pain meds and unable to assist with rx requiring A X 2 for bed mobility and unable to stand,will attempt in PM with OT.    Rehab Prognosis:  Good; patient would benefit from acute skilled PT services to address these deficits and reach maximum level of function.      Recent Surgery: Procedure(s) (LRB):  ARTHROPLASTY, KNEE (Right)  REMOVAL, HARDWARE (Right) 1 Day Post-Op    Plan:     During this hospitalization, patient to be seen BID to address the above listed problems via gait training, therapeutic activities, therapeutic exercises  · Plan of Care Expires:  11/30/18   Plan of Care Reviewed with: patient    Subjective     Communicated with nsg prior to session.  Patient found supine upon PT entry to room, agreeable to treatment.      Chief Complaint: n/a  Patient comments/goals: pain with R le movement  Pain/Comfort:  · Pain Rating 1: 8/10  · Location - Side 1: Right  · Location - Orientation 1: generalized  · Location 1: knee  · Pain Addressed 1: Reposition, Distraction  · Pain Rating Post-Intervention 1: 9/10    Patients cultural, spiritual, Caodaism conflicts given the current situation:      Objective:     Patient found with: SCD     General Precautions: Standard, fall   Orthopedic Precautions:RLE weight bearing as tolerated   Braces:  N/A     Functional Mobility:  · Bed Mobility:     · Supine to Sit: maximal assistance  · Sit to Supine: maximal assistance and of 2 persons  · Balance: fair sitting balance      AM-PAC 6 CLICK MOBILITY  Turning over in bed (including adjusting bedclothes, sheets and blankets)?: 2  Sitting down on and standing up from a chair with arms (e.g., wheelchair, bedside commode, etc.): 1  Moving from lying on back to sitting on the side of the bed?: 2  Moving to and from a bed to a chair (including a wheelchair)?: 1  Need to walk in hospital room?: 1  Climbing 3-5 steps with a railing?: 1  Basic Mobility Total Score: 8       Therapeutic Activities and Exercises: le supine ex's with Max A and v/c's to alert pt and stay on task required,attempted sit-stand to RW with Dep A X 2 and unable to perform       Patient left supine with all lines intact, call button in reach, bed alarm on, nsg notified and spouse present..    GOALS: see general POC  Multidisciplinary Problems     Physical Therapy Goals        Problem: Physical Therapy Goal    Goal Priority Disciplines Outcome Goal Variances Interventions   Physical Therapy Goal     PT, PT/OT Ongoing (interventions implemented as appropriate)     Description:  Goals to be met by: 2018     Patient will increase functional independence with mobility by performin. Supine to sit with Modified [place]  2. Sit to stand transfer with Modified [place]  3. Gait  x 100 feet with Modified [place] using Rolling Walker.                       Time Tracking:     PT Received On: 10/31/18  PT Start Time: 1012     PT Stop Time: 1039  PT Total Time (min): 27 min     Billable Minutes: Therapeutic Activity 15 and Therapeutic Exercise 12    Treatment Type: Treatment  PT/PTA: PTA     PTA Visit Number: 1     Phill Pierre, BECKI  10/31/2018

## 2018-11-01 PROBLEM — R41.0 TRANSIENT CONFUSION: Status: ACTIVE | Noted: 2018-11-01

## 2018-11-01 PROBLEM — Z01.818 PREOP TESTING: Status: RESOLVED | Noted: 2018-10-17 | Resolved: 2018-11-01

## 2018-11-01 PROBLEM — R44.3 HALLUCINATIONS: Status: RESOLVED | Noted: 2018-11-01 | Resolved: 2018-11-01

## 2018-11-01 PROBLEM — R44.3 HALLUCINATIONS: Status: ACTIVE | Noted: 2018-11-01

## 2018-11-01 PROBLEM — R41.0 TRANSIENT CONFUSION: Status: RESOLVED | Noted: 2018-11-01 | Resolved: 2018-11-01

## 2018-11-01 PROBLEM — Z96.651 STATUS POST TOTAL RIGHT KNEE REPLACEMENT: Status: ACTIVE | Noted: 2018-10-30

## 2018-11-01 LAB
ANION GAP SERPL CALC-SCNC: 10 MMOL/L
BASOPHILS # BLD AUTO: 0.01 K/UL
BASOPHILS NFR BLD: 0.1 %
BILIRUB UR QL STRIP: NEGATIVE
BUN SERPL-MCNC: 13 MG/DL
CALCIUM SERPL-MCNC: 9.2 MG/DL
CHLORIDE SERPL-SCNC: 101 MMOL/L
CLARITY UR: CLEAR
CO2 SERPL-SCNC: 27 MMOL/L
COLOR UR: YELLOW
CREAT SERPL-MCNC: 0.6 MG/DL
DIFFERENTIAL METHOD: ABNORMAL
EOSINOPHIL # BLD AUTO: 0.1 K/UL
EOSINOPHIL NFR BLD: 0.8 %
ERYTHROCYTE [DISTWIDTH] IN BLOOD BY AUTOMATED COUNT: 12.9 %
EST. GFR  (AFRICAN AMERICAN): >60 ML/MIN/1.73 M^2
EST. GFR  (NON AFRICAN AMERICAN): >60 ML/MIN/1.73 M^2
GLUCOSE SERPL-MCNC: 131 MG/DL
GLUCOSE UR QL STRIP: NEGATIVE
HCT VFR BLD AUTO: 33.5 %
HGB BLD-MCNC: 11 G/DL
HGB UR QL STRIP: NEGATIVE
KETONES UR QL STRIP: NEGATIVE
LEUKOCYTE ESTERASE UR QL STRIP: NEGATIVE
LYMPHOCYTES # BLD AUTO: 0.9 K/UL
LYMPHOCYTES NFR BLD: 11.2 %
MCH RBC QN AUTO: 31.4 PG
MCHC RBC AUTO-ENTMCNC: 32.8 G/DL
MCV RBC AUTO: 96 FL
MONOCYTES # BLD AUTO: 0.8 K/UL
MONOCYTES NFR BLD: 9.5 %
NEUTROPHILS # BLD AUTO: 6.5 K/UL
NEUTROPHILS NFR BLD: 77.9 %
NITRITE UR QL STRIP: NEGATIVE
PH UR STRIP: 6 [PH] (ref 5–8)
PLATELET # BLD AUTO: 177 K/UL
PMV BLD AUTO: 10.3 FL
POTASSIUM SERPL-SCNC: 3.6 MMOL/L
PROT UR QL STRIP: NEGATIVE
RBC # BLD AUTO: 3.5 M/UL
SODIUM SERPL-SCNC: 138 MMOL/L
SP GR UR STRIP: 1.01 (ref 1–1.03)
URN SPEC COLLECT METH UR: NORMAL
UROBILINOGEN UR STRIP-ACNC: NEGATIVE EU/DL
WBC # BLD AUTO: 8.31 K/UL

## 2018-11-01 PROCEDURE — 94799 UNLISTED PULMONARY SVC/PX: CPT

## 2018-11-01 PROCEDURE — 36415 COLL VENOUS BLD VENIPUNCTURE: CPT

## 2018-11-01 PROCEDURE — 11000001 HC ACUTE MED/SURG PRIVATE ROOM

## 2018-11-01 PROCEDURE — 99900035 HC TECH TIME PER 15 MIN (STAT)

## 2018-11-01 PROCEDURE — 81003 URINALYSIS AUTO W/O SCOPE: CPT

## 2018-11-01 PROCEDURE — 97116 GAIT TRAINING THERAPY: CPT

## 2018-11-01 PROCEDURE — 97535 SELF CARE MNGMENT TRAINING: CPT

## 2018-11-01 PROCEDURE — 85025 COMPLETE CBC W/AUTO DIFF WBC: CPT

## 2018-11-01 PROCEDURE — 97110 THERAPEUTIC EXERCISES: CPT

## 2018-11-01 PROCEDURE — 25000003 PHARM REV CODE 250

## 2018-11-01 PROCEDURE — 80048 BASIC METABOLIC PNL TOTAL CA: CPT

## 2018-11-01 RX ORDER — TRAMADOL HYDROCHLORIDE 50 MG/1
50 TABLET ORAL EVERY 6 HOURS PRN
Qty: 40 TABLET | Refills: 0
Start: 2018-11-01

## 2018-11-01 RX ORDER — ACETAMINOPHEN 325 MG/1
650 TABLET ORAL EVERY 4 HOURS PRN
Refills: 0 | COMMUNITY
Start: 2018-11-01

## 2018-11-01 RX ORDER — DOCUSATE SODIUM 100 MG/1
100 CAPSULE, LIQUID FILLED ORAL EVERY 12 HOURS
Refills: 0 | COMMUNITY
Start: 2018-11-02

## 2018-11-01 RX ADMIN — LOSARTAN POTASSIUM 100 MG: 50 TABLET, FILM COATED ORAL at 08:11

## 2018-11-01 RX ADMIN — DOCUSATE SODIUM 100 MG: 100 CAPSULE, LIQUID FILLED ORAL at 08:11

## 2018-11-01 RX ADMIN — THERA TABS 1 TABLET: TAB at 08:11

## 2018-11-01 RX ADMIN — MUPIROCIN 1 G: 20 OINTMENT TOPICAL at 08:11

## 2018-11-01 RX ADMIN — RIVAROXABAN 10 MG: 10 TABLET, FILM COATED ORAL at 05:11

## 2018-11-01 RX ADMIN — HYDROCHLOROTHIAZIDE 12.5 MG: 12.5 TABLET ORAL at 08:11

## 2018-11-01 RX ADMIN — CELECOXIB 200 MG: 100 CAPSULE ORAL at 08:11

## 2018-11-01 RX ADMIN — Medication 1 CAPSULE: at 08:11

## 2018-11-01 RX ADMIN — FAMOTIDINE 20 MG: 20 TABLET ORAL at 08:11

## 2018-11-01 RX ADMIN — VITAMIN B12 0.1 MG ORAL TABLET 100 MCG: 0.1 TABLET ORAL at 08:11

## 2018-11-01 RX ADMIN — POTASSIUM CHLORIDE 20 MEQ: 20 TABLET, EXTENDED RELEASE ORAL at 08:11

## 2018-11-01 RX ADMIN — AMLODIPINE BESYLATE 10 MG: 5 TABLET ORAL at 08:11

## 2018-11-01 RX ADMIN — OXYCODONE HYDROCHLORIDE AND ACETAMINOPHEN 1000 MG: 500 TABLET ORAL at 08:11

## 2018-11-01 NOTE — PT/OT/SLP PROGRESS
Occupational Therapy   Treatment    Name: Betzaida Lerma  MRN: [id number]  Admitting Diagnosis:  Right knee DJD  2 Days Post-Op    Recommendations:     Discharge Recommendations: home health PT  Discharge Equipment Recommendations:  none  Barriers to discharge:  None    Subjective     Communicated with: nsg prior to session.  Pain/Comfort:  · Pain Rating 1: 0/10    Patients cultural, spiritual, Shinto conflicts given the current situation: (none reported)    Objective:     Patient found with:      General Precautions: Standard, fall   Orthopedic Precautions:RLE weight bearing as tolerated   Braces: N/A     Occupational Performance:    Bed Mobility:    · Patient completed Scooting/Bridging with moderate assistance  · Patient completed Supine to Sit with moderate assistance  · Patient completed Sit to Supine with moderate assistance     Functional Mobility/Transfers:  · Patient completed Sit <> Stand Transfer with moderate assistance  with  rolling walker   · Patient completed Toilet Transfer Step Transfer technique with moderate assistance with  rolling walker and bedside commode  · Functional Mobility: Mod A/Min A with RW; max cues for sequencing     Activities of Daily Living:  · Lower Body Dressing: total assistance    · Toileting: maximal assistance      Patient left supine with all lines intact, call button in reach, bed alarm on, nsg notified and spouse  present    [facility] 6 Click:  [facility] Total Score: 15    Treatment & Education:  Pt with less confusion this date from previous sessions, however, requires max A cues for instructions at times   Mod A for bed mobility; requires assist BLEs   Min A EOB seated scooting to bring feet to floor   Mod A standing trials from EOB with RW   Min/Mod A functional mobility in room with max a verbal cues for sequencing steps with RW   Toileting on BSC with max a   Education:    Assessment:     Betzaida Lerma is a 73 y.o. female with a medical diagnosis of Right knee DJD.  She  presents with s/o R TKA .  Performance deficits affecting function are weakness, impaired self care skills, impaired balance, impaired functional mobilty, impaired endurance, gait instability, impaired cognition, decreased lower extremity function, decreased safety awareness, decreased ROM, impaired skin, edema, orthopedic precautions.  Pt with increased functional mobility this date, however, does remain slightly confused; Max/total a required for LBD and toileting on BSC; mod A standing trials and min/mod A ambulation at this time. Cont OT POC     Rehab Prognosis:  Good ; patient would benefit from acute skilled OT services to address these deficits and reach maximum level of function.       Plan:     Patient to be seen 5 x/week to address the above listed problems via self-care/home management, therapeutic exercises, therapeutic activities  · Plan of Care Expires: 11/30/18  · Plan of Care Reviewed with: patient, spouse    This Plan of care has been discussed with the patient who was involved in its development and understands and is in agreement with the identified goals and treatment plan    GOALS:   Multidisciplinary Problems     Occupational Therapy Goals        Problem: Occupational Therapy Goal    Goal Priority Disciplines Outcome Interventions   Occupational Therapy Goal     OT, PT/OT Ongoing (interventions implemented as appropriate)    Description:  Goals to be met by: 11/30/18     Patient will increase functional independence with ADLs by performing:    LE Dressing with Supervision.  Grooming while standing with Supervision.  Toileting from toilet with Supervision for hygiene and clothing management.   Supine to sit with Supervision.  Step transfer with Supervision  Toilet transfer to toilet with Supervision.                      Time Tracking:     OT Date of Treatment: 11/01/18  OT Start Time: 1036  OT Stop Time: 1111  OT Total Time (min): 35 min    Billable Minutes:Self Care/Home Management 19 co  treatment with OLINDA Pacheco, OT  11/1/2018

## 2018-11-01 NOTE — PROGRESS NOTES
[facility]  Orthopedics  Progress Note    Patient Name: Betzaida Lerma  MRN: [id number]  Admission Date: 10/30/2018  Hospital Length of Stay: 1 days  Attending Provider: Sohan Colvin MD  Primary Care Provider: Concepcion Gonzáles MD  Follow-up For: Procedure(s) (LRB):  ARTHROPLASTY, KNEE (Right)  REMOVAL, HARDWARE (Right)    Post-Operative Day: 2 Days Post-Op  Subjective:     Principal Problem:Right knee DJD    Principal Orthopedic Problem: Post op Rt TKA    Interval History: Hallucinating last pm, still drowsy.  Pain ok as long as sh does not move    Review of patient's allergies indicates:  No Known Allergies    Current Facility-Administered Medications   Medication    acetaminophen tablet 650 mg    amLODIPine tablet 10 mg    ascorbic acid (vitamin C) tablet 1,000 mg    celecoxib capsule 200 mg    cyanocobalamin tablet 100 mcg    docusate sodium capsule 100 mg    ergocalciferol capsule 50,000 Units    famotidine tablet 20 mg    losartan tablet 100 mg    And    hydroCHLOROthiazide tablet 12.5 mg    ketorolac injection 15 mg    ketorolac tablet 10 mg    methocarbamol tablet 500 mg    multivitamin tablet 1 tablet    mupirocin 2 % ointment 1 g    naloxone 0.4 mg/mL injection 0.2 mg    omega-3 fatty acids-fish oil 340-1,000 mg capsule 1 capsule    ondansetron injection 4 mg    potassium chloride SA CR tablet 20 mEq    promethazine (PHENERGAN) 6.25 mg in dextrose 5 % 50 mL IVPB    rivaroxaban tablet 10 mg    ropivacaine (NAROPIN) 0.2% ON-Q C-BLOC 550mL (med + pump) (SAF)    sodium chloride 0.9% flush 5 mL     Objective:     Vital Signs (Most Recent):  Temp: 99.1 °F (37.3 °C) (11/01/18 0732)  Pulse: 74 (11/01/18 0800)  Resp: 17 (11/01/18 0800)  BP: (!) 153/71 (11/01/18 0732)  SpO2: (!) 90 % (11/01/18 0800) Vital Signs (24h Range):  Temp:  [97 °F (36.1 °C)-99.1 °F (37.3 °C)] 99.1 °F (37.3 °C)  Pulse:  [73-78] 74  Resp:  [17-24] 17  SpO2:  [90 %] 90 %  BP: (127-153)/(61-82)  "153/71     Weight: 99.4 kg (219 lb 2.2 oz)  Height: 5' 6" (167.6 cm)  Body mass index is 35.37 kg/m².      Intake/Output Summary (Last 24 hours) at 11/1/2018 0933  Last data filed at 11/1/2018 0800  Gross per 24 hour   Intake 680 ml   Output 1900 ml   Net -1220 ml       Ortho/SPM Exam  Incision intact.  Alert to person and place now.   Homans neg.   Heart-rrr   Lungs clear    Significant Labs:   BMP: No results for input(s): GLU, NA, K, CL, CO2, BUN, CREATININE, CALCIUM, MG in the last 48 hours.  CBC:   Recent Labs   Lab 10/31/18  0616   HCT 35.0*     All pertinent labs within the past 24 hours have been reviewed.    Significant Imaging: None    Assessment/Plan:     Active Diagnoses:    Diagnosis Date Noted POA    PRINCIPAL PROBLEM:  Right knee DJD [M17.11] 10/30/2018 Yes    Painful orthopaedic hardware [T84.84XA] 10/30/2018 Yes    Left knee DJD [M17.12] 10/30/2018 Yes    Essential hypertension [I10]  Yes     Chronic      Problems Resolved During this Admission:   Post op Rt TKA  Post op hallucinations and drowsiness, prob residual from narcotics.  Will ask hospitalist to assess  Home with C PT when stable    Sohan Colvin MD  Orthopedics  [facility]  "

## 2018-11-01 NOTE — PLAN OF CARE
Problem: Patient Care Overview  Goal: Plan of Care Review  Outcome: Ongoing (interventions implemented as appropriate)  Pt AAOx4, VSS, NAD noted, no complaints of n/v/d or pain,  at bedside, patient still states she's seeing things move in her room, tolerating regular diet, bed alarm active, safety has been maintained, will continue to monitor.

## 2018-11-01 NOTE — PT/OT/SLP PROGRESS
Physical Therapy Treatment    Patient Name:  Betzaida Lerma   MRN:  [id number]    Recommendations:     Discharge Recommendations:  home health OT, home health PT   Discharge Equipment Recommendations: none   Barriers to discharge: decreased mobility,strength and ROM    Assessment:     Betzaida Lerma is a 73 y.o. female admitted with a medical diagnosis of Right knee DJD.  She presents with the following impairments/functional limitations:  weakness, impaired endurance, impaired functional mobilty, gait instability, impaired balance, impaired cognition, decreased lower extremity function, pain, decreased ROM, impaired coordination, impaired skin, orthopedic precautions ,pt with clearing cognition from medication but remains a little foggy,improved mobility today and will benefit from  services upon discharge.    Rehab Prognosis:  Good; patient would benefit from acute skilled PT services to address these deficits and reach maximum level of function.      Recent Surgery: Procedure(s) (LRB):  ARTHROPLASTY, KNEE (Right)  REMOVAL, HARDWARE (Right) 2 Days Post-Op    Plan:     During this hospitalization, patient to be seen BID to address the above listed problems via gait training, therapeutic activities, therapeutic exercises  · Plan of Care Expires:  11/30/18   Plan of Care Reviewed with: patient, spouse    Subjective     Communicated with nsg prior to session.  Patient found supine upon PT entry to room, agreeable to treatment.      Chief Complaint: n/a  Patient comments/goals: pt agreeable to rx  Pain/Comfort:  · Pain Rating 1: (no rating)  · Location - Side 1: Right  · Location - Orientation 1: generalized  · Location 1: knee(with movement)  · Pain Addressed 1: Reposition, Distraction    Patients cultural, spiritual, Religion conflicts given the current situation:      Objective:     Patient found with: SCD     General Precautions: Standard, (Q ball)   Orthopedic Precautions:RLE weight bearing as tolerated    Braces: N/A     Functional Mobility:  · Bed Mobility:     · Supine to Sit: moderate assistance  · Sit to Supine: moderate assistance  · Transfers:     · Sit to Stand:  moderate assistance and SBA of another with rolling walker  · Gait: amb ~10' X 2 with RW and Min A X 2 for safety and v/c's  · Balance: Fair standing balance with RW      AM-PAC 6 CLICK MOBILITY  Turning over in bed (including adjusting bedclothes, sheets and blankets)?: 2  Sitting down on and standing up from a chair with arms (e.g., wheelchair, bedside commode, etc.): 2  Moving from lying on back to sitting on the side of the bed?: 2  Moving to and from a bed to a chair (including a wheelchair)?: 2  Need to walk in hospital room?: 2  Climbing 3-5 steps with a railing?: 1  Basic Mobility Total Score: 11       Therapeutic Activities and Exercises: le seated laq's with Mod A on R X 10 reps       Patient left supine with all lines intact, call button in reach, bed alarm on, ice pack on R knee with instruction of 20 mins only to  and pt present and n/a..    GOALS: see general POC  Multidisciplinary Problems     Physical Therapy Goals        Problem: Physical Therapy Goal    Goal Priority Disciplines Outcome Goal Variances Interventions   Physical Therapy Goal     PT, PT/OT Ongoing (interventions implemented as appropriate)     Description:  Goals to be met by: 2018     Patient will increase functional independence with mobility by performin. Supine to sit with Modified [place]  2. Sit to stand transfer with Modified [place]  3. Gait  x 100 feet with Modified [place] using Rolling Walker.                       Time Tracking:     PT Received On: 18  PT Start Time: 1036     PT Stop Time: 1111  PT Total Time (min): 35 min     Billable Minutes: Gait Training 16 and Total Time 35    Treatment Type: Treatment  PT/PTA: PTA     PTA Visit Number: 2     Phill Pierre, PTA  2018
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
## 2018-11-01 NOTE — PLAN OF CARE
Problem: Physical Therapy Goal  Goal: Physical Therapy Goal  Goals to be met by: 2018     Patient will increase functional independence with mobility by performin. Supine to sit with Modified [place]  2. Sit to stand transfer with Modified [place]  3. Gait  x 100 feet with Modified [place] using Rolling Walker.      Outcome: Ongoing (interventions implemented as appropriate)  Goals ongoing

## 2018-11-01 NOTE — PLAN OF CARE
Problem: Patient Care Overview  Goal: Plan of Care Review  Outcome: Ongoing (interventions implemented as appropriate)  Plan of care discussed with patient and spouse. Pain is well controlled with OnQball. Right knee incision dressing is CDI. Patient was very somnolent at start of shift but has improved with alertness. Denies any pain or nausea at this time. Encouraged patient to use call light to voice needs. Will continue to monitor.
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
## 2018-11-01 NOTE — ASSESSMENT & PLAN NOTE
Painful retained hardware of right proximal tibia  S/p right knee arthroplasty   S/p right proximal tibia hardware removal     -surgical recs per orthopedic surgery, POD #2   -avoid opioids   -PT/OT

## 2018-11-01 NOTE — PROGRESS NOTES
Follow-Up       Follow-up With  Details  Why  Contact Info   Sohan Colvin MD  On 11/14/2018  10:00 am   [address]  [address]  [place] [address]  [phone]   [address] [place] [address]  [phone]

## 2018-11-01 NOTE — PT/OT/SLP PROGRESS
Physical Therapy Treatment    Patient Name:  Betzaida Lerma   MRN:  [id number]    Recommendations:     Discharge Recommendations:  home health PT   Discharge Equipment Recommendations: none   Barriers to discharge: decreased mobility,endurance and ROM    Assessment:     Betzaida Lerma is a 73 y.o. female admitted with a medical diagnosis of Right knee DJD.  She presents with the following impairments/functional limitations:  weakness, impaired endurance, impaired functional mobilty, gait instability, impaired balance, impaired cognition, decreased lower extremity function, decreased safety awareness, pain, decreased ROM, impaired coordination, impaired skin, orthopedic precautions ,pt with increased gait this PM and assistance X 1,pt better and still a little foggy,pt will benefit from AM rx before discharge home and will benefit from HH services.    Rehab Prognosis:  Good; patient would benefit from acute skilled PT services to address these deficits and reach maximum level of function.      Recent Surgery: Procedure(s) (LRB):  ARTHROPLASTY, KNEE (Right)  REMOVAL, HARDWARE (Right) 2 Days Post-Op    Plan:     During this hospitalization, patient to be seen BID to address the above listed problems via gait training, therapeutic activities, therapeutic exercises  · Plan of Care Expires:  11/30/18   Plan of Care Reviewed with: patient, spouse    Subjective     Communicated with nsg prior to session.  Patient found EOB upon PT entry to room, agreeable to treatment.      Chief Complaint: n/a  Patient comments/goals: pt feeling better  Pain/Comfort:  · Pain Rating 1: 3/10  · Location - Side 1: Right  · Location - Orientation 1: generalized  · Location 1: knee  · Pain Addressed 1: Reposition, Distraction  · Pain Rating Post-Intervention 1: 3/10    Patients cultural, spiritual, Uatsdin conflicts given the current situation:      Objective:     Patient found with: SCD     General Precautions: Standard, fall   Orthopedic  Precautions:RLE weight bearing as tolerated   Braces: N/A     Functional Mobility:  · Bed Mobility:     · Sit to Supine: moderate assistance and at le's  · Transfers:     · Sit to Stand:  contact guard assistance and minimum assistance with rolling walker  · Gait: amb ~35' X 1 with RW and Min A with v/c's  · Balance: fair standing balance with RW      AM-PAC 6 CLICK MOBILITY  Turning over in bed (including adjusting bedclothes, sheets and blankets)?: 2  Sitting down on and standing up from a chair with arms (e.g., wheelchair, bedside commode, etc.): 3  Moving from lying on back to sitting on the side of the bed?: 2  Moving to and from a bed to a chair (including a wheelchair)?: 3  Need to walk in hospital room?: 3  Climbing 3-5 steps with a railing?: 1  Basic Mobility Total Score: 14       Therapeutic Activities and Exercises: le supine ex's X 15 reps inc: ap,qs,hs,abd/add,slr       Patient left supine with all lines intact, call button in reach, bed alarm on, nsg notified and spouse present..    GOALS: see general POC  Multidisciplinary Problems     Physical Therapy Goals        Problem: Physical Therapy Goal    Goal Priority Disciplines Outcome Goal Variances Interventions   Physical Therapy Goal     PT, PT/OT Ongoing (interventions implemented as appropriate)     Description:  Goals to be met by: 2018     Patient will increase functional independence with mobility by performin. Supine to sit with Modified [place]  2. Sit to stand transfer with Modified [place]  3. Gait  x 100 feet with Modified [place] using Rolling Walker.                       Time Tracking:     PT Received On: 18  PT Start Time: 1334     PT Stop Time: 1404  PT Total Time (min): 30 min     Billable Minutes: Gait Training 16 and Therapeutic Exercise 14    Treatment Type: Treatment  PT/PTA: PTA     PTA Visit Number: 2     Phill Pierre, PTA  2018

## 2018-11-01 NOTE — PLAN OF CARE
Problem: Patient Care Overview  Goal: Plan of Care Review  Outcome: Ongoing (interventions implemented as appropriate)  Pt without pain except with moving; no pain medication given today. On-q- ball in placed. Pt very drossy today which was better in the evening;with MD aware.  Right knee with dressing clean, dry and intact.

## 2018-11-01 NOTE — PLAN OF CARE
Problem: Occupational Therapy Goal  Goal: Occupational Therapy Goal  Goals to be met by: 11/30/18     Patient will increase functional independence with ADLs by performing:    LE Dressing with Supervision.  Grooming while standing with Supervision.  Toileting from toilet with Supervision for hygiene and clothing management.   Supine to sit with Supervision.  Step transfer with Supervision  Toilet transfer to toilet with Supervision.     Outcome: Ongoing (interventions implemented as appropriate)  Pt with increased functional mobility this date, however, does remain slightly confused; Max/total a required for LBD and toileting on BSC; mod A standing trials and min/mod A ambulation at this time. Cont OT POC

## 2018-11-01 NOTE — HPI
Mrs. Betzaida Lerma is a pleasant 73-year-old female  with advanced DJD of the right knee.  She is remotely treated for proximal tibial fracture with ORIF and has painful hardware lateral that is also prominent and in the way of her knee replacement. The patient was admitted on 10/30/18 for removal of hardware, right proximal tibia and right total knee arthroplasty. Pt now post op day #2. Hospital medicine consulted for management of postoperative confusion and hallucinations. The patient's  reports she was doing well following her procedure however after receiving oxycodone on 10/30 for pain management she appeared confused. Pt was unaware of surroundings with reports of hallucinations and poor coordination. Pt AAOx4 on current examination, neuro intact.  reports menta status has improved. The patient reports ongoing intermittent hallucinations. Pt denies chest pain, SOB, n/v, abd pain, fever/chills. Vitals stable. Pt with hx of HTN, anemia, OA and obesity.

## 2018-11-01 NOTE — ASSESSMENT & PLAN NOTE
Hallucinations     Pt AAOx4 at this time, neuro intact. Pt with onset of AMS and hallucinations after receiving medications for pain management following surgery. Symptoms likely 2/2 anesthesia, opioid use. Pt vitals stable, no evidence of metabolic or infectious cause of transient encephalopathy     -avoid opioids, anticholinergics and antispasmodics   -check CBC, BMP

## 2018-11-02 VITALS
SYSTOLIC BLOOD PRESSURE: 158 MMHG | RESPIRATION RATE: 18 BRPM | TEMPERATURE: 99 F | HEIGHT: 66 IN | WEIGHT: 222 LBS | DIASTOLIC BLOOD PRESSURE: 74 MMHG | HEART RATE: 79 BPM | OXYGEN SATURATION: 90 % | BODY MASS INDEX: 35.68 KG/M2

## 2018-11-02 PROCEDURE — 97110 THERAPEUTIC EXERCISES: CPT

## 2018-11-02 PROCEDURE — 63600175 PHARM REV CODE 636 W HCPCS

## 2018-11-02 PROCEDURE — 97116 GAIT TRAINING THERAPY: CPT

## 2018-11-02 PROCEDURE — 25000003 PHARM REV CODE 250

## 2018-11-02 RX ADMIN — AMLODIPINE BESYLATE 10 MG: 5 TABLET ORAL at 08:11

## 2018-11-02 RX ADMIN — LOSARTAN POTASSIUM 100 MG: 50 TABLET, FILM COATED ORAL at 08:11

## 2018-11-02 RX ADMIN — Medication 1 CAPSULE: at 08:11

## 2018-11-02 RX ADMIN — OXYCODONE HYDROCHLORIDE AND ACETAMINOPHEN 1000 MG: 500 TABLET ORAL at 08:11

## 2018-11-02 RX ADMIN — DOCUSATE SODIUM 100 MG: 100 CAPSULE, LIQUID FILLED ORAL at 08:11

## 2018-11-02 RX ADMIN — VITAMIN B12 0.1 MG ORAL TABLET 100 MCG: 0.1 TABLET ORAL at 08:11

## 2018-11-02 RX ADMIN — MUPIROCIN 1 G: 20 OINTMENT TOPICAL at 08:11

## 2018-11-02 RX ADMIN — POTASSIUM CHLORIDE 20 MEQ: 20 TABLET, EXTENDED RELEASE ORAL at 08:11

## 2018-11-02 RX ADMIN — THERA TABS 1 TABLET: TAB at 08:11

## 2018-11-02 RX ADMIN — FAMOTIDINE 20 MG: 20 TABLET ORAL at 08:11

## 2018-11-02 RX ADMIN — CELECOXIB 200 MG: 100 CAPSULE ORAL at 08:11

## 2018-11-02 RX ADMIN — HYDROCHLOROTHIAZIDE 12.5 MG: 12.5 TABLET ORAL at 08:11

## 2018-11-02 NOTE — PLAN OF CARE
pt d/cd to home this am   TN met with pt and spouse 11/01   discussed f/u apts, set up with [facility]    f/u apt with pcp       Follow-Up         Follow-up With   Details   Why   Contact Info   Sohan Colvin MD   On 11/14/2018   10:00 am    [address]  [address]  [place] [address]  [phone]   [address] [address]  [phone]              11/02/18 1156   Final Note   Assessment Type Final Discharge Note   Anticipated Discharge Disposition Home-Health  ([facility] )   What phone number can be called within the next 1-3 days to see how you are doing after discharge? [phone]   Hospital Follow Up  Appt(s) scheduled? Yes   Discharge plans and expectations educations in teach back method with documentation complete? Yes   Right Care Referral Info   Post Acute Recommendation No Care   Referral Type (home care )   Facility Name [facility]

## 2018-11-02 NOTE — PLAN OF CARE
Problem: Patient Care Overview  Goal: Plan of Care Review  Outcome: Ongoing (interventions implemented as appropriate)  Plan of care reviewed with patient. Patient verbalized complete understanding. Fall precautions maintained. Bed in lowest position, locked, call light within reach, and bed alarm on. Side rails up x's 2 with slip resistant socks on. Nurse instructed patient to notify staff for any assistance and pt verbalized complete understanding. Assisted to Bedside commode during shift. Will continue to monitor. Daughter remain at bedside.

## 2018-11-02 NOTE — PT/OT/SLP PROGRESS
Physical Therapy Treatment    Patient Name:  Betzaida Lerma   MRN:  [id number]    Recommendations:     Discharge Recommendations:  home health PT   Discharge Equipment Recommendations: none   Barriers to discharge: decreased ROM and endurance    Assessment:     Betzaida Lerma is a 73 y.o. female admitted with a medical diagnosis of Right knee DJD.  She presents with the following impairments/functional limitations:  weakness, impaired endurance, impaired functional mobilty, gait instability, impaired balance, decreased safety awareness, decreased ROM, impaired coordination, impaired skin, orthopedic precautions ,pt with improving status and cleared cognition from meds,pt will benefit from  services upon discharge.    Rehab Prognosis:  Excellent; patient would benefit from acute skilled PT services to address these deficits and reach maximum level of function.      Recent Surgery: Procedure(s) (LRB):  ARTHROPLASTY, KNEE (Right)  REMOVAL, HARDWARE (Right) 3 Days Post-Op    Plan:     During this hospitalization, patient to be seen BID to address the above listed problems via gait training, therapeutic activities, therapeutic exercises  · Plan of Care Expires:  11/30/18   Plan of Care Reviewed with: patient, spouse, daughter    Subjective     Communicated with nsg prior to session.  Patient found EOB upon PT entry to room, agreeable to treatment.      Chief Complaint: n/a  Patient comments/goals: pt is using b/s commode alot  Pain/Comfort:  · Pain Rating 1: (no c/o's)    Patients cultural, spiritual, Catholic conflicts given the current situation:      Objective:     Patient found with: (Q ball)     General Precautions: Standard, fall   Orthopedic Precautions:RLE weight bearing as tolerated   Braces: N/A     Functional Mobility:  · Transfers:     · Sit to Stand:  stand by assistance and contact guard assistance with rolling walker  · Gait: amb ~115' X 1 with RW and CGA/Min A  · Balance: fair standing balance with  RW      AM-PAC 6 CLICK MOBILITY  Turning over in bed (including adjusting bedclothes, sheets and blankets)?: 3  Sitting down on and standing up from a chair with arms (e.g., wheelchair, bedside commode, etc.): 3  Moving from lying on back to sitting on the side of the bed?: 3  Moving to and from a bed to a chair (including a wheelchair)?: 3  Need to walk in hospital room?: 3  Climbing 3-5 steps with a railing?: 2  Basic Mobility Total Score: 17       Therapeutic Activities and Exercises: seated R le self assisted knee flexion X 10 sec hold X 5 reps,and AA knee flexion and hip flexion X 10 reps       Patient left EOB with call button in reach, nsg notified and family present..    GOALS: see general POC  Multidisciplinary Problems     Physical Therapy Goals     Not on file          Multidisciplinary Problems (Resolved)        Problem: Physical Therapy Goal    Goal Priority Disciplines Outcome Goal Variances Interventions   Physical Therapy Goal   (Resolved)     PT, PT/OT Outcome(s) achieved     Description:  Goals to be met by: 2018     Patient will increase functional independence with mobility by performin. Supine to sit with Modified [place]  2. Sit to stand transfer with Modified [place]  3. Gait  x 100 feet with Modified [place] using Rolling Walker.                       Time Tracking:     PT Received On: 18  PT Start Time: 08     PT Stop Time: 925  PT Total Time (min): 27 min     Billable Minutes: Gait Training 16 and Therapeutic Exercise 11    Treatment Type: Treatment  PT/PTA: PTA     PTA Visit Number: 3     Phill Pierre, PTA  2018

## 2018-11-06 NOTE — DISCHARGE SUMMARY
[facility]  General Surgery  Discharge Summary      Patient Name: Betzaida Lerma  MRN: [id number]  Admission Date: 10/30/2018  Hospital Length of Stay: 2 days  Discharge Date and Time: 11/2/2018 10:40 AM  Attending Physician: No att. providers found   Discharging Provider: Sohan Hodges MD  Primary Care Provider: Concepcion Gonzáles MD     HPI: 74 yo admitted for TKA    Procedure(s) (LRB):  ARTHROPLASTY, KNEE (Right)  REMOVAL, HARDWARE (Right)     Hospital Course: After surgery, asdmitted for pain control, PT, OT and monitor For falls and DVT.  At the time of discharge, she was stable. Tolerating diet and ambulating with walker    Consults:   Consults (From admission, onward)        Status Ordering Provider     Inpatient consult to [facility]     Provider:  Dinesh Salmon MD    Completed SOHAN HODGES          Significant Diagnostic Studies: Labs: All labs within the past 24 hours have been reviewed  Radiology: X-Ray: knee films reviewed    Pending Diagnostic Studies:     None        Final Active Diagnoses:    Diagnosis Date Noted POA    PRINCIPAL PROBLEM:  Right knee DJD [M17.11] 10/30/2018 Yes    Painful orthopaedic hardware [T84.84XA] 10/30/2018 Yes    Left knee DJD [M17.12] 10/30/2018 Yes    Status post total right knee replacement [Z96.651] 10/30/2018 Not Applicable    Anemia [D64.9] 09/20/2013 Yes    Essential hypertension [I10]  Yes     Chronic      Problems Resolved During this Admission:    Diagnosis Date Noted Date Resolved POA    Transient confusion [R41.0] 11/01/2018 11/01/2018 No    Hallucinations [R44.3] 11/01/2018 11/01/2018 No      Discharged Condition: stable    Disposition: Home or Self Care    Follow Up:  Follow-up Information     Sohan Hodges MD On 11/14/2018.    Specialty:  Orthopedic Surgery  Why:  10:00 am     Contact information:  [address]  [address] [place], [place] [address] [address] [phone]             [facility]  [facility].    Specialties:  DME Provider, Home Health Services  Why:  Home Health  Contact information:  [address] [facility] [address]  [phone]                 Patient Instructions:      Referral to Home health   Referral Priority: Routine Referral Type: Home Health   Referral Reason: Specialty Services Required   Requested Specialty: Home Health Services   Number of Visits Requested: 1     Referral to Home health   Referral Priority: Routine Referral Type: Home Health   Referral Reason: Specialty Services Required   Requested Specialty: Home Health Services   Number of Visits Requested: 1     Diet general     Diet general     Keep surgical extremity elevated     Ice to affected area     No driving, operating heavy equipment or signing legal documents while taking pain medication     Call MD for:  persistent nausea and vomiting     Call MD for:  temperature >100.4     Call MD for:  extreme fatigue     Call MD for:  persistent dizziness or light-headedness     Call MD for:  hives     Call MD for:  redness, tenderness, or signs of infection (pain, swelling, redness, odor or green/yellow discharge around incision site)     Call MD for:  difficulty breathing, headache or visual disturbances     Call MD for:  severe uncontrolled pain     No dressing needed     Keep surgical extremity elevated     Ice to affected area     No driving, operating heavy equipment or signing legal documents while taking pain medication     Call MD for:  extreme fatigue     Call MD for:  persistent dizziness or light-headedness     Call MD for:  hives     Call MD for:  redness, tenderness, or signs of infection (pain, swelling, redness, odor or green/yellow discharge around incision site)     Call MD for:  difficulty breathing, headache or visual disturbances     Call MD for:  severe uncontrolled pain     Call MD for:  persistent nausea and vomiting     Call MD for:  temperature >100.4     Remove dressing in 24 hours     Shower on  day dressing removed (No bath)     Weight bearing as tolerated     Shower on day dressing removed (No bath)     Weight bearing as tolerated     Medications:  Reconciled Home Medications:      Medication List      START taking these medications    acetaminophen 325 MG tablet  Commonly known as:  TYLENOL  Take 2 tablets (650 mg total) by mouth every 4 (four) hours as needed.     docusate sodium 100 MG capsule  Commonly known as:  COLACE  Take 1 capsule (100 mg total) by mouth every 12 (twelve) hours.     * rivaroxaban 10 mg Tab  Commonly known as:  XARELTO  Take 1 tablet (10 mg total) by mouth once daily. for 12 days     * XARELTO 10 mg Tab  Generic drug:  rivaroxaban  Take 1 tablet (10 mg total) by mouth daily with dinner or evening meal. for 12 days     traMADol 50 mg tablet  Commonly known as:  ULTRAM  Take 1 tablet (50 mg total) by mouth every 6 (six) hours as needed for Pain.         * This list has 2 medication(s) that are the same as other medications prescribed for you. Read the directions carefully, and ask your doctor or other care provider to review them with you.            CONTINUE taking these medications    amLODIPine 10 MG tablet  Commonly known as:  NORVASC  Take 1 tablet (10 mg total) by mouth once daily.     cyanocobalamin 1000 MCG tablet  Commonly known as:  VITAMIN B-12  Take 100 mcg by mouth once daily.     fish oil-omega-3 fatty acids 300-1,000 mg capsule  Take 1 g by mouth once daily.     losartan-hydrochlorothiazide 100-12.5 mg 100-12.5 mg Tab  Commonly known as:  HYZAAR  Take 1 tablet by mouth once daily.     methocarbamol 500 MG Tab  Commonly known as:  ROBAXIN  TK 1 T PO TID PRF SPASMS     ONE DAILY MULTIVITAMIN per tablet  Generic drug:  multivitamin  Take 1 tablet by mouth once daily.     potassium chloride 20 mEq  Commonly known as:  K-TAB  TK 1 T PO BID     traZODone 100 MG tablet  Commonly known as:  DESYREL  Take 1 tablet (100 mg total) by mouth every evening.     UNABLE TO  FIND  Ultra hair     VITAMIN C 500 MG tablet  Generic drug:  ascorbic acid (vitamin C)  Take 1,000 mg by mouth once daily.     VITAMIN D2 50,000 unit Cap  Generic drug:  ergocalciferol  Take 50,000 Units by mouth every 7 days.        STOP taking these medications    ibuprofen 800 MG tablet  Commonly known as:  ERIN GARNER MD  General Surgery  [facility]

## 2018-11-19 ENCOUNTER — HOSPITAL ENCOUNTER (OUTPATIENT)
Dept: RADIOLOGY | Facility: HOSPITAL | Age: 73
Discharge: HOME | End: 2018-11-19
Payer: MEDICARE

## 2018-11-19 DIAGNOSIS — I82.409 DVT OF LEG (DEEP VENOUS THROMBOSIS): Primary | ICD-10-CM

## 2018-11-19 DIAGNOSIS — M79.661 PAIN IN RIGHT LOWER LEG: Primary | ICD-10-CM

## 2018-11-19 DIAGNOSIS — M79.661 PAIN IN RIGHT LOWER LEG: ICD-10-CM

## 2018-11-19 PROCEDURE — 93971 EXTREMITY STUDY: CPT | Mod: TC,PO

## 2019-03-28 ENCOUNTER — PES CALL (OUTPATIENT)
Dept: ADMINISTRATIVE | Facility: CLINIC | Age: 74
End: 2019-03-28

## 2019-07-23 ENCOUNTER — PES CALL (OUTPATIENT)
Dept: ADMINISTRATIVE | Facility: CLINIC | Age: 74
End: 2019-07-23

## 2020-01-20 ENCOUNTER — TELEPHONE (OUTPATIENT)
Dept: NEUROLOGY | Facility: CLINIC | Age: 75
End: 2020-01-20

## 2020-01-20 NOTE — TELEPHONE ENCOUNTER
Returned pt's call; spoke to pt's  explained that we don't have any availability at this time; provided number for [facility] and pt's  stated he has number for [facility]; pt will be seeing pcp and will discuss with pcp what to do next; pt's  verbalized understanding.

## 2020-01-20 NOTE — TELEPHONE ENCOUNTER
----- Message from Gennaro Hinson sent at 1/20/2020  9:04 AM CST -----  Contact: pt Franco Alvarez   Type:  Sooner Apoointment Request    Caller is requesting a sooner appointment.  Caller declined first available appointment listed below.  Caller will not accept being placed on the waitlist and is requesting a message be sent to doctor.    Name of Caller:  Pt franco Alvarez   When is the first available appointment? Unavailable   Symptoms:    Best Call Back Number:     Additional Information:  Hospital f/u, recommended by Dr.Haley Yuan [facility], needs to be seen asap

## 2020-01-21 ENCOUNTER — TELEPHONE (OUTPATIENT)
Dept: NEUROLOGY | Facility: CLINIC | Age: 75
End: 2020-01-21

## 2020-01-21 NOTE — TELEPHONE ENCOUNTER
Returned call to [facility] Nurse Navigator and notified that Dr. Barclay not seeing clinic pts at this time, only inpatient. Offered waiting list for pt to see Dr. Rodriguez in April or [facility]. Reports that pt anxious to be seen sooner than April. States that she will send me a secure chat if pt wishes to wait until April.

## 2020-01-21 NOTE — TELEPHONE ENCOUNTER
----- Message from Farzana Gong sent at 1/21/2020 11:27 AM CST -----  Contact: Nathan  Type: Needs Medical Advice    Who Called: Nurse Nathan  With   Symptoms (please be specific):    How long has patient had these symptoms:    Pharmacy name and phone #:    Best Call Back Number: [phone]  Additional Information: requesting a call back regarding appointment for patient
